# Patient Record
Sex: MALE | NOT HISPANIC OR LATINO | URBAN - METROPOLITAN AREA
[De-identification: names, ages, dates, MRNs, and addresses within clinical notes are randomized per-mention and may not be internally consistent; named-entity substitution may affect disease eponyms.]

---

## 2018-01-13 ENCOUNTER — INPATIENT (INPATIENT)
Facility: HOSPITAL | Age: 63
LOS: 4 days | Discharge: HOME HEALTH | End: 2018-01-18
Attending: HOSPITALIST | Admitting: HOSPITALIST
Payer: COMMERCIAL

## 2018-01-13 VITALS
HEART RATE: 55 BPM | RESPIRATION RATE: 20 BRPM | OXYGEN SATURATION: 100 % | TEMPERATURE: 98 F | SYSTOLIC BLOOD PRESSURE: 175 MMHG | DIASTOLIC BLOOD PRESSURE: 88 MMHG

## 2018-01-13 DIAGNOSIS — I10 ESSENTIAL (PRIMARY) HYPERTENSION: ICD-10-CM

## 2018-01-13 DIAGNOSIS — I72.9 ANEURYSM OF UNSPECIFIED SITE: ICD-10-CM

## 2018-01-13 DIAGNOSIS — I63.9 CEREBRAL INFARCTION, UNSPECIFIED: ICD-10-CM

## 2018-01-13 DIAGNOSIS — Z29.9 ENCOUNTER FOR PROPHYLACTIC MEASURES, UNSPECIFIED: ICD-10-CM

## 2018-01-13 DIAGNOSIS — I67.1 CEREBRAL ANEURYSM, NONRUPTURED: ICD-10-CM

## 2018-01-13 LAB
ALBUMIN SERPL ELPH-MCNC: 4.6 G/DL — SIGNIFICANT CHANGE UP (ref 3.3–5)
ALP SERPL-CCNC: 64 U/L — SIGNIFICANT CHANGE UP (ref 40–120)
ALT FLD-CCNC: 14 U/L — SIGNIFICANT CHANGE UP (ref 4–41)
APPEARANCE UR: CLEAR — SIGNIFICANT CHANGE UP
AST SERPL-CCNC: 13 U/L — SIGNIFICANT CHANGE UP (ref 4–40)
BASOPHILS # BLD AUTO: 0.02 K/UL — SIGNIFICANT CHANGE UP (ref 0–0.2)
BASOPHILS NFR BLD AUTO: 0.2 % — SIGNIFICANT CHANGE UP (ref 0–2)
BILIRUB SERPL-MCNC: 0.8 MG/DL — SIGNIFICANT CHANGE UP (ref 0.2–1.2)
BILIRUB UR-MCNC: NEGATIVE — SIGNIFICANT CHANGE UP
BLOOD UR QL VISUAL: NEGATIVE — SIGNIFICANT CHANGE UP
BUN SERPL-MCNC: 20 MG/DL — SIGNIFICANT CHANGE UP (ref 7–23)
CALCIUM SERPL-MCNC: 9.2 MG/DL — SIGNIFICANT CHANGE UP (ref 8.4–10.5)
CHLORIDE SERPL-SCNC: 99 MMOL/L — SIGNIFICANT CHANGE UP (ref 98–107)
CO2 SERPL-SCNC: 32 MMOL/L — HIGH (ref 22–31)
COLOR SPEC: SIGNIFICANT CHANGE UP
CREAT SERPL-MCNC: 1.32 MG/DL — HIGH (ref 0.5–1.3)
EOSINOPHIL # BLD AUTO: 0.02 K/UL — SIGNIFICANT CHANGE UP (ref 0–0.5)
EOSINOPHIL NFR BLD AUTO: 0.2 % — SIGNIFICANT CHANGE UP (ref 0–6)
GLUCOSE SERPL-MCNC: 151 MG/DL — HIGH (ref 70–99)
GLUCOSE UR-MCNC: NEGATIVE — SIGNIFICANT CHANGE UP
HCT VFR BLD CALC: 53.9 % — HIGH (ref 39–50)
HGB BLD-MCNC: 18.2 G/DL — HIGH (ref 13–17)
IMM GRANULOCYTES # BLD AUTO: 0.03 # — SIGNIFICANT CHANGE UP
IMM GRANULOCYTES NFR BLD AUTO: 0.4 % — SIGNIFICANT CHANGE UP (ref 0–1.5)
KETONES UR-MCNC: NEGATIVE — SIGNIFICANT CHANGE UP
LEUKOCYTE ESTERASE UR-ACNC: NEGATIVE — SIGNIFICANT CHANGE UP
LYMPHOCYTES # BLD AUTO: 1.82 K/UL — SIGNIFICANT CHANGE UP (ref 1–3.3)
LYMPHOCYTES # BLD AUTO: 22.3 % — SIGNIFICANT CHANGE UP (ref 13–44)
MCHC RBC-ENTMCNC: 29.9 PG — SIGNIFICANT CHANGE UP (ref 27–34)
MCHC RBC-ENTMCNC: 33.8 % — SIGNIFICANT CHANGE UP (ref 32–36)
MCV RBC AUTO: 88.5 FL — SIGNIFICANT CHANGE UP (ref 80–100)
MONOCYTES # BLD AUTO: 0.36 K/UL — SIGNIFICANT CHANGE UP (ref 0–0.9)
MONOCYTES NFR BLD AUTO: 4.4 % — SIGNIFICANT CHANGE UP (ref 2–14)
MUCOUS THREADS # UR AUTO: SIGNIFICANT CHANGE UP
NEUTROPHILS # BLD AUTO: 5.91 K/UL — SIGNIFICANT CHANGE UP (ref 1.8–7.4)
NEUTROPHILS NFR BLD AUTO: 72.5 % — SIGNIFICANT CHANGE UP (ref 43–77)
NITRITE UR-MCNC: NEGATIVE — SIGNIFICANT CHANGE UP
NRBC # FLD: 0 — SIGNIFICANT CHANGE UP
PH UR: 7 — SIGNIFICANT CHANGE UP (ref 4.6–8)
PLATELET # BLD AUTO: 213 K/UL — SIGNIFICANT CHANGE UP (ref 150–400)
PMV BLD: 11.1 FL — SIGNIFICANT CHANGE UP (ref 7–13)
POTASSIUM SERPL-MCNC: 4.3 MMOL/L — SIGNIFICANT CHANGE UP (ref 3.5–5.3)
POTASSIUM SERPL-SCNC: 4.3 MMOL/L — SIGNIFICANT CHANGE UP (ref 3.5–5.3)
PROT SERPL-MCNC: 8.4 G/DL — HIGH (ref 6–8.3)
PROT UR-MCNC: 20 MG/DL — SIGNIFICANT CHANGE UP
RBC # BLD: 6.09 M/UL — HIGH (ref 4.2–5.8)
RBC # FLD: 13 % — SIGNIFICANT CHANGE UP (ref 10.3–14.5)
RBC CASTS # UR COMP ASSIST: SIGNIFICANT CHANGE UP (ref 0–?)
SODIUM SERPL-SCNC: 144 MMOL/L — SIGNIFICANT CHANGE UP (ref 135–145)
SP GR SPEC: > 1.04 — HIGH (ref 1–1.04)
UROBILINOGEN FLD QL: NORMAL MG/DL — SIGNIFICANT CHANGE UP
WBC # BLD: 8.16 K/UL — SIGNIFICANT CHANGE UP (ref 3.8–10.5)
WBC # FLD AUTO: 8.16 K/UL — SIGNIFICANT CHANGE UP (ref 3.8–10.5)
WBC UR QL: SIGNIFICANT CHANGE UP (ref 0–?)

## 2018-01-13 PROCEDURE — 99223 1ST HOSP IP/OBS HIGH 75: CPT

## 2018-01-13 PROCEDURE — 70498 CT ANGIOGRAPHY NECK: CPT | Mod: 26

## 2018-01-13 PROCEDURE — 99251: CPT

## 2018-01-13 PROCEDURE — 70496 CT ANGIOGRAPHY HEAD: CPT | Mod: 26

## 2018-01-13 PROCEDURE — 70450 CT HEAD/BRAIN W/O DYE: CPT | Mod: 26,59

## 2018-01-13 RX ORDER — ENOXAPARIN SODIUM 100 MG/ML
40 INJECTION SUBCUTANEOUS EVERY 24 HOURS
Qty: 0 | Refills: 0 | Status: DISCONTINUED | OUTPATIENT
Start: 2018-01-13 | End: 2018-01-18

## 2018-01-13 RX ORDER — ASPIRIN/CALCIUM CARB/MAGNESIUM 324 MG
81 TABLET ORAL ONCE
Qty: 0 | Refills: 0 | Status: COMPLETED | OUTPATIENT
Start: 2018-01-13 | End: 2018-01-13

## 2018-01-13 RX ORDER — ATORVASTATIN CALCIUM 80 MG/1
40 TABLET, FILM COATED ORAL AT BEDTIME
Qty: 0 | Refills: 0 | Status: DISCONTINUED | OUTPATIENT
Start: 2018-01-13 | End: 2018-01-16

## 2018-01-13 RX ORDER — ASPIRIN/CALCIUM CARB/MAGNESIUM 324 MG
81 TABLET ORAL DAILY
Qty: 0 | Refills: 0 | Status: DISCONTINUED | OUTPATIENT
Start: 2018-01-13 | End: 2018-01-18

## 2018-01-13 RX ORDER — SODIUM CHLORIDE 9 MG/ML
1000 INJECTION INTRAMUSCULAR; INTRAVENOUS; SUBCUTANEOUS ONCE
Qty: 0 | Refills: 0 | Status: COMPLETED | OUTPATIENT
Start: 2018-01-13 | End: 2018-01-13

## 2018-01-13 RX ORDER — ATORVASTATIN CALCIUM 80 MG/1
40 TABLET, FILM COATED ORAL ONCE
Qty: 0 | Refills: 0 | Status: COMPLETED | OUTPATIENT
Start: 2018-01-13 | End: 2018-01-13

## 2018-01-13 RX ORDER — METOCLOPRAMIDE HCL 10 MG
10 TABLET ORAL ONCE
Qty: 0 | Refills: 0 | Status: COMPLETED | OUTPATIENT
Start: 2018-01-13 | End: 2018-01-13

## 2018-01-13 RX ADMIN — SODIUM CHLORIDE 1000 MILLILITER(S): 9 INJECTION INTRAMUSCULAR; INTRAVENOUS; SUBCUTANEOUS at 13:12

## 2018-01-13 RX ADMIN — Medication 10 MILLIGRAM(S): at 13:12

## 2018-01-13 RX ADMIN — Medication 81 MILLIGRAM(S): at 15:21

## 2018-01-13 RX ADMIN — ATORVASTATIN CALCIUM 40 MILLIGRAM(S): 80 TABLET, FILM COATED ORAL at 22:04

## 2018-01-13 RX ADMIN — ATORVASTATIN CALCIUM 40 MILLIGRAM(S): 80 TABLET, FILM COATED ORAL at 15:00

## 2018-01-13 RX ADMIN — ENOXAPARIN SODIUM 40 MILLIGRAM(S): 100 INJECTION SUBCUTANEOUS at 22:04

## 2018-01-13 NOTE — H&P ADULT - NEUROLOGICAL DETAILS
sensation intact/no spontaneous movement/alert and oriented x 3/responds to verbal commands/cranial nerves intact/normal strength

## 2018-01-13 NOTE — CONSULT NOTE ADULT - ASSESSMENT
62 yo man who presents to [facility] w/ 2 week onset of worsening dizziness (room is spinning) refractory to meclizine (given by OSH 2 weeks ago). Patinet notes dizziness is worse w/ movement. ROS also revealed gait ataxia (left-sided preference) and N/V, otherwise unremarkable for focal weakness, numbness/tingling, acute vision changes, chest pain, fever, URI symptoms, tinnitus. 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 Patient's SBP > 190 on admission. tPA not given, patient outside recommended window. NIHSS 0 MRS 0 64 yo right-handed  man who presents to [facility] w/ 2 week onset of worsening dizziness (room is spinning) refractory to meclizine (given by OSH 2 weeks ago). Patient notes dizziness is worse w/ movement. ROS also revealed gait ataxia (left-sided preference) and N/V, otherwise unremarkable for focal weakness, numbness/tingling, acute vision changes, chest pain, fever, URI symptoms, tinnitus. 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 Patient's SBP > 190 on admission. tPA not given, patient outside recommended window. NIHSS 0 MRS 0

## 2018-01-13 NOTE — H&P ADULT - ASSESSMENT
63M admitted for CVA and 2.5 mm downward projecting aneurysm off the distal right M1 segment just distal to a focal area of stenosis along the right M1 segment.

## 2018-01-13 NOTE — ED PROVIDER NOTE - OBJECTIVE STATEMENT
63 YOM pmh vertigo dx 2 weeks ago on meclizine at home, HTN (uncontrolled on sartan, metoprolol) presents to ed with worsening of his vertigo, ataxia and nausea and vomiting. Pt denies any weakness or numbness in extremities. Pt denies any vision changes. 63 YOM pmh vertigo dx 2 weeks ago on meclizine at home, HTN (uncontrolled on sartan, metoprolol) presents to ed with worsening of his vertigo, ataxia and nausea and vomiting. Pt denies any weakness or numbness in extremities. Pt denies any vision changes. Pt denies any chest pain, denies any lightheadedness, denies any fever URI symptoms, denies any ringing in ears. Symptoms are triggered with movement. Pt states he feels as if he is unsteady when he is walking and that acutely worsened last night. Pt is falling to left side.

## 2018-01-13 NOTE — H&P ADULT - RS GEN PE MLT RESP DETAILS PC
no intercostal retractions/no rales/respirations non-labored/no chest wall tenderness/no subcutaneous emphysema/breath sounds equal/clear to auscultation bilaterally/good air movement/no rhonchi/airway patent

## 2018-01-13 NOTE — H&P ADULT - HISTORY OF PRESENT ILLNESS
63M self ambulating with a history of HTN, experiencing exertional, dizziness, describes a the room spinning, ataxia, nausea, vomit x 3 episodes in the past 3 days, last vomit wan on 1/12, HA. Two weeks ago, the pt went to [facility] when the symptoms began.  He was diagnosed with Vertigo and placed on Meclizine. The symptoms did not resolve with Meclizine, so the pt came to [facility]. The pt denies falls, tinnitus, vison changes, chest pain, palpitation, SOB, weakness to any place on his body, chills, diaphoresis, dysuria. In the Ed, the pt had a CTH that revealed Right cerebellar infarct of indeterminate age. Brain MRI follow-up recommended. Probable chronic left thalamic lacunar infarct.  CTA Head showed 2.5 mm downward projecting aneurysm off the distal right M1 segment just distal to a focal area of stenosis along the right M1 segment.  The pt was seen by Neurology. Their consult and recommendations are in the chart.  Neurosurgery was called for consult on the 2.5 mm downward projecting aneurysm. Currently the pt still experiencing exertional dizziness and ataxia. 63M  with a history of HTN, came in c/o dizziness and ataxia describes a the room spinning (since Dec 31st) , pt also reports, nausea, vomiting x 3 episodes in the past 3 days, last episode of vomiting was on 1/12. Two weeks ago, the pt went to [facility] when the symptoms began.  He was diagnosed with Vertigo and placed on Meclizine. The symptoms did not resolve with Meclizine, so the pt came to [facility]. The pt denies falls, tinnitus, vison changes, chest pain, palpitation, SOB, weakness, chills, diaphoresis, dysuria. In the ED, the pt had a CTH that revealed Right cerebellar infarct of indeterminate age. Brain MRI follow-up recommended. Probable chronic left thalamic lacunar infarct.  CTA Head showed 2.5 mm downward projecting aneurysm off the distal right M1 segment just distal to a focal area of stenosis along the right M1 segment.  The pt was seen by Neurology. Their consult and recommendations are in the chart.  Neurosurgery was called for consult on the 2.5 mm downward projecting aneurysm. Currently the pt still experiencing exertional dizziness and ataxia.

## 2018-01-13 NOTE — ED PROVIDER NOTE - MEDICAL DECISION MAKING DETAILS
Ataxia worsening over past 2 weeks, concern for posterior CVA. Will treat symptomatically get Ekg, CTH and basic labs. Ddx CVA, BPPV. Will get neurology consult likely CDU for MRI if CT negative.

## 2018-01-13 NOTE — CONSULT NOTE ADULT - PROBLEM SELECTOR RECOMMENDATION 2
Outpatient follow up with Dr Betts [phone]
2.5mm distal M1  Plan:   -recommend neurosurgery consult   -BP control

## 2018-01-13 NOTE — H&P ADULT - NEGATIVE ENMT SYMPTOMS
no nasal congestion/no nasal obstruction/no tinnitus/no sinus symptoms/no nasal discharge/no recurrent cold sores/no abnormal taste sensation/no post-nasal discharge/no gum bleeding

## 2018-01-13 NOTE — H&P ADULT - ATTENDING COMMENTS
Pt was seen and examined, agree with H&P done by PA edited as appropriate briefly this is a 63YOM with h/o htn, admitted for CVA ( cerebellar infarct ) and 2.5 mm downward projecting aneurysm off the distal right M1 segment just distal to a focal area of stenosis along the right M1 segment.  Exam: as above  Plan: Neuro consult appreciated, ct head done, ct angio of the head and neck done already, await MRI, check echo, tele, f/u ekg, permissive htn for now, PT consult, neuro sx consult for aneurysm, dvt ppx, plan discussed with PA.

## 2018-01-13 NOTE — H&P ADULT - MUSCULOSKELETAL
details… detailed exam no joint swelling/no joint erythema/no joint warmth/no calf tenderness/normal strength

## 2018-01-13 NOTE — ED ADULT TRIAGE NOTE - CHIEF COMPLAINT QUOTE
Pt was treated for vertigo  on 12/31 . Pt given meclizine Per  family and pt medication  not working . Pt co dizziness with nausea  on and off and unsteady balance.

## 2018-01-13 NOTE — CONSULT NOTE ADULT - SUBJECTIVE AND OBJECTIVE BOX
62 yo right-handed male who presents to [facility] w/ 2 week onset of worsening dizziness (room is spinning) refractory to meclizine (given by OSH 2 weeks ago). Patient notes dizziness is worse w/ movement. ROS also revealed gait ataxia (left-sided preference) and N/V, otherwise unremarkable for focal weakness, numbness/tingling, acute vision changes, chest pain, fever, URI symptoms, tinnitus.     WDWN male in NAD  Vital Signs Last 24 Hrs  T(C): 37 (13 Jan 2018 17:24), Max: 37 (13 Jan 2018 17:24)  T(F): 98.6 (13 Jan 2018 17:24), Max: 98.6 (13 Jan 2018 17:24)  HR: 62 (13 Jan 2018 17:24) (54 - 62)  BP: 148/84 (13 Jan 2018 17:24) (143/79 - 192/100)  BP(mean): --  RR: 16 (13 Jan 2018 17:24) (16 - 20)  SpO2: 99% (13 Jan 2018 17:24) (98% - 100%)    AAO X 3  PERRLA, EOMI  CN 2-12 grossly intact  PENA strength 5/5, no pronator drift    Brain CT:  There is subtle lucency noted within the right cerebellar   hemisphere consistent with an infarct of indeterminate age.    Left thalamic lucency likely represents chronic lacunar infarct.    CTA head/neck: Nonvisualization and likely occlusion of the right posterior   inferior cerebellar artery. This correlates to patient's right cerebellar   infarct seen on previous head CT. Variant cerebral arterial anatomy as   noted above.  2.5 mm downward projecting aneurysm off the distal right M1 segment just   distal to a focal area of stenosis along the right M1 segment..

## 2018-01-13 NOTE — H&P ADULT - GASTROINTESTINAL DETAILS
no rebound tenderness/no rigidity/soft/no bruit/no guarding/nontender/no organomegaly/no masses palpable/bowel sounds normal

## 2018-01-13 NOTE — ED PROVIDER NOTE - NS ED ROS FT
CONSTITUTIONAL: No fevers, no chills  Eyes: no visual changes  Ears: no ear drainage, no ear pain  Nose: no nasal congestion  Mouth/Throat: no sore throat  Cardiovascular: No Chest pain  Respiratory: No SOB  Gastrointestinal: No n/v/d, no abd pain  Genitourinary: no dysuria, no hematuria  SKIN: no rashes.  NEURO: +vertigo  PSYCHIATRIC: no known mental health issues.

## 2018-01-13 NOTE — H&P ADULT - NSHPSOCIALHISTORY_GEN_ALL_CORE
and lives with spouse.   Denies Nicotine.   Denies ETOH.  and lives with spouse.   Denies Nicotine.   Denies ETOH or drug use

## 2018-01-13 NOTE — H&P ADULT - NEGATIVE MUSCULOSKELETAL SYMPTOMS
no back pain/no joint swelling/no muscle weakness/no muscle cramps/no leg pain L/no myalgia/no stiffness/no neck pain/no arm pain L/no arm pain R/no leg pain R

## 2018-01-13 NOTE — ED ADULT NURSE REASSESSMENT NOTE - NS ED NURSE REASSESS COMMENT FT1
pt passed dysphagia screening for fluids  dinner tray given/ pt comfortable and awaits tele bed.  family at bedside

## 2018-01-13 NOTE — H&P ADULT - PROBLEM SELECTOR PLAN 1
Neuro consult appreciated.   CM  F/U EKG, TTE Bubble study, MRI Brain, FLP, LDL Goal < 70, A1C.  if CTH negative for hemorrhage, start ASA 81mg, statin  Permissive HTN, -180  PT/OT/ SS .

## 2018-01-13 NOTE — CONSULT NOTE ADULT - SUBJECTIVE AND OBJECTIVE BOX
Neurology Consult    Name: DEBORAH KENNEDY    HPI: 64 yo man who presents to [facility] w/ 2 week onset of worsening dizziness (room is spinning) refractory to meclizine (given by OSH 2 weeks ago). Michael notes dizziness is worse w/ movement. ROS also revealed gait ataxia (left-sided preference) and N/V, otherwise unremarkable for focal weakness, numbness/tingling, acute vision changes, chest pain, fever, URI symptoms, tinnitus. 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 Patient's SBP > 190 on admission. tPA not given, patient outside recommended window. NIHSS MRS     PMH/PSH:   HTN (poorly controlled)   HLD     MEDICATIONS  (STANDING):  aspirin  chewable 81 milliGRAM(s) Oral once  atorvastatin 40 milliGRAM(s) Oral once    Allergies: No Known Allergies    Objective:   Vital Signs Last 24 Hrs  T(C): 36.7 (13 Jan 2018 11:12), Max: 36.7 (13 Jan 2018 11:12)  T(F): 98 (13 Jan 2018 11:12), Max: 98 (13 Jan 2018 11:12)  HR: 60 (13 Jan 2018 13:11) (55 - 60)  BP: 143/79 (13 Jan 2018 13:11) (143/79 - 192/100)  BP(mean): --  RR: 18 (13 Jan 2018 13:11) (18 - 20)  SpO2: 100% (13 Jan 2018 13:11) (100% - 100%)    General Exam:   General appearance: No acute distress                   Neurological Exam:  Mental Status: AAOx3, fluent speech, follows commands    Cranial Nerves: EOMI, PERRL, V1-V3 intact, facial symmetry intact, no dysarthria, tongue midline, VFF    Motor: 5/5 throughout. No drift x4    Sensation: Intact to LT throughout    Coordination: FTN intact b/l    Reflexes: 1+ bilateral biceps, brachioradialis, patellar and ankle    Gait: normal and stable.      Labs:    01-13    144  |  99  |  20  ----------------------------<  151<H>  4.3   |  32<H>  |  1.32<H>    Ca    9.2      13 Jan 2018 12:45    TPro  8.4<H>  /  Alb  4.6  /  TBili  0.8  /  DBili  x   /  AST  13  /  ALT  14  /  AlkPhos  64  01-13    LIVER FUNCTIONS - ( 13 Jan 2018 12:45 )  Alb: 4.6 g/dL / Pro: 8.4 g/dL / ALK PHOS: 64 u/L / ALT: 14 u/L / AST: 13 u/L / GGT: x           CBC Full  -  ( 13 Jan 2018 12:45 )  WBC Count : 8.16 K/uL  Hemoglobin : 18.2 g/dL  Hematocrit : 53.9 %  Platelet Count - Automated : 213 K/uL  Mean Cell Volume : 88.5 fL  Mean Cell Hemoglobin : 29.9 pg  Mean Cell Hemoglobin Concentration : 33.8 %  Auto Neutrophil # : 5.91 K/uL  Auto Lymphocyte # : 1.82 K/uL  Auto Monocyte # : 0.36 K/uL  Auto Eosinophil # : 0.02 K/uL  Auto Basophil # : 0.02 K/uL  Auto Neutrophil % : 72.5 %  Auto Lymphocyte % : 22.3 %  Auto Monocyte % : 4.4 %  Auto Eosinophil % : 0.2 %  Auto Basophil % : 0.2 %      Radiology  < from: CT Head No Cont (01.13.18 @ 13:04) >  Impression:    Right cerebellar infarct of indeterminate age. Brain MRI follow-up is   recommended.  Probable chronic left thalamic lacunar infarct.    < end of copied text > Neurology Consult    Name: DEBORAH KENNEDY    HPI: 64 yo man who presents to [facility] w/ 2 week onset of worsening dizziness (room is spinning) refractory to meclizine (given by OSH 2 weeks ago). Patinet notes dizziness is worse w/ movement. ROS also revealed gait ataxia (left-sided preference) and N/V, otherwise unremarkable for focal weakness, numbness/tingling, acute vision changes, chest pain, fever, URI symptoms, tinnitus. 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 Patient's SBP > 190 on admission. tPA not given, patient outside recommended window. NIHSS 0 MRS 0    PMH/PSH:   HTN (poorly controlled)   HLD     MEDICATIONS  (STANDING):  aspirin  chewable 81 milliGRAM(s) Oral once  atorvastatin 40 milliGRAM(s) Oral once    Allergies: No Known Allergies    Objective:   Vital Signs Last 24 Hrs  T(C): 36.7 (13 Jan 2018 11:12), Max: 36.7 (13 Jan 2018 11:12)  T(F): 98 (13 Jan 2018 11:12), Max: 98 (13 Jan 2018 11:12)  HR: 60 (13 Jan 2018 13:11) (55 - 60)  BP: 143/79 (13 Jan 2018 13:11) (143/79 - 192/100)  BP(mean): --  RR: 18 (13 Jan 2018 13:11) (18 - 20)  SpO2: 100% (13 Jan 2018 13:11) (100% - 100%)    General Exam:   General appearance: No acute distress                   Neurological Exam:  Mental Status: AAOx3, fluent speech, follows commands    Cranial Nerves: EOMI w/ b/l horizontal nystagmus (not fatiguable) and vertical nystagmus (fatiguable), PERRL, V1-V3 intact, facial symmetry intact, no dysarthria, tongue midline, VFF    Motor: 5/5 throughout. No drift x4    Sensation: Intact to LT throughout    Coordination: FTN intact b/l, HTS intact b/l, slower on right UE w/ alternating finger movements vs. normal left.     Reflexes: 1+ bilateral biceps, brachioradialis, patellar and ankle, Babinski absent (toes downgoing b/l)     Gait: unstable gait (left-sided lean)      Labs:    01-13    144  |  99  |  20  ----------------------------<  151<H>  4.3   |  32<H>  |  1.32<H>    Ca    9.2      13 Jan 2018 12:45    TPro  8.4<H>  /  Alb  4.6  /  TBili  0.8  /  DBili  x   /  AST  13  /  ALT  14  /  AlkPhos  64  01-13    LIVER FUNCTIONS - ( 13 Jan 2018 12:45 )  Alb: 4.6 g/dL / Pro: 8.4 g/dL / ALK PHOS: 64 u/L / ALT: 14 u/L / AST: 13 u/L / GGT: x           CBC Full  -  ( 13 Jan 2018 12:45 )  WBC Count : 8.16 K/uL  Hemoglobin : 18.2 g/dL  Hematocrit : 53.9 %  Platelet Count - Automated : 213 K/uL  Mean Cell Volume : 88.5 fL  Mean Cell Hemoglobin : 29.9 pg  Mean Cell Hemoglobin Concentration : 33.8 %  Auto Neutrophil # : 5.91 K/uL  Auto Lymphocyte # : 1.82 K/uL  Auto Monocyte # : 0.36 K/uL  Auto Eosinophil # : 0.02 K/uL  Auto Basophil # : 0.02 K/uL  Auto Neutrophil % : 72.5 %  Auto Lymphocyte % : 22.3 %  Auto Monocyte % : 4.4 %  Auto Eosinophil % : 0.2 %  Auto Basophil % : 0.2 %      Radiology  < from: CT Head No Cont (01.13.18 @ 13:04) >  Impression:    Right cerebellar infarct of indeterminate age. Brain MRI follow-up is   recommended.  Probable chronic left thalamic lacunar infarct.    < end of copied text >  < from: CT Angio Neck w/ IV Cont (01.13.18 @ 14:32) >  CTA HEAD: Nonvisualization and likely occlusion of the right posterior   inferior cerebellar artery. This correlates to patient's right cerebellar   infarct seen on previous head CT. Variant cerebral arterial anatomy as   noted above.  2.5 mm downwardprojecting aneurysm off the distal right M1 segment just   distal to a focal area of stenosis along the right M1 segment..    < end of copied text > Neurology Consult    Name: DEBORAH KENNEDY    HPI: 64 yo right-handed  man who presents to [facility] w/ 2 week onset of worsening dizziness (room is spinning) refractory to meclizine (given by OSH 2 weeks ago). Patient notes dizziness is worse w/ movement. ROS also revealed gait ataxia (left-sided preference) and N/V, otherwise unremarkable for focal weakness, numbness/tingling, acute vision changes, chest pain, fever, URI symptoms, tinnitus. 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 Patient's SBP > 190 on admission. tPA not given, patient outside recommended window. NIHSS 0 MRS 0    PMH/PSH:   HTN (poorly controlled)   HLD     MEDICATIONS  (STANDING):  aspirin  chewable 81 milliGRAM(s) Oral once  atorvastatin 40 milliGRAM(s) Oral once    Allergies: No Known Allergies    Objective:   Vital Signs Last 24 Hrs  T(C): 36.7 (13 Jan 2018 11:12), Max: 36.7 (13 Jan 2018 11:12)  T(F): 98 (13 Jan 2018 11:12), Max: 98 (13 Jan 2018 11:12)  HR: 60 (13 Jan 2018 13:11) (55 - 60)  BP: 143/79 (13 Jan 2018 13:11) (143/79 - 192/100)  BP(mean): --  RR: 18 (13 Jan 2018 13:11) (18 - 20)  SpO2: 100% (13 Jan 2018 13:11) (100% - 100%)    General Exam:   General appearance: No acute distress                   Neurological Exam:  Mental Status: AAOx3, fluent speech, follows commands    Cranial Nerves: EOMI w/ b/l horizontal nystagmus (not fatiguable) and vertical nystagmus (fatiguable), PERRL, V1-V3 intact, facial symmetry intact, no dysarthria, tongue midline, VFF    Motor: 5/5 throughout. No drift x4    Sensation: Intact to LT throughout    Coordination: FTN intact b/l, HTS intact b/l, slower on right UE w/ alternating finger movements vs. normal left.     Reflexes: 1+ bilateral biceps, brachioradialis, patellar and ankle, Babinski absent (toes downgoing b/l)     Gait: unstable gait (left-sided lean)      Labs:    01-13    144  |  99  |  20  ----------------------------<  151<H>  4.3   |  32<H>  |  1.32<H>    Ca    9.2      13 Jan 2018 12:45    TPro  8.4<H>  /  Alb  4.6  /  TBili  0.8  /  DBili  x   /  AST  13  /  ALT  14  /  AlkPhos  64  01-13    LIVER FUNCTIONS - ( 13 Jan 2018 12:45 )  Alb: 4.6 g/dL / Pro: 8.4 g/dL / ALK PHOS: 64 u/L / ALT: 14 u/L / AST: 13 u/L / GGT: x           CBC Full  -  ( 13 Jan 2018 12:45 )  WBC Count : 8.16 K/uL  Hemoglobin : 18.2 g/dL  Hematocrit : 53.9 %  Platelet Count - Automated : 213 K/uL  Mean Cell Volume : 88.5 fL  Mean Cell Hemoglobin : 29.9 pg  Mean Cell Hemoglobin Concentration : 33.8 %  Auto Neutrophil # : 5.91 K/uL  Auto Lymphocyte # : 1.82 K/uL  Auto Monocyte # : 0.36 K/uL  Auto Eosinophil # : 0.02 K/uL  Auto Basophil # : 0.02 K/uL  Auto Neutrophil % : 72.5 %  Auto Lymphocyte % : 22.3 %  Auto Monocyte % : 4.4 %  Auto Eosinophil % : 0.2 %  Auto Basophil % : 0.2 %      Radiology  < from: CT Head No Cont (01.13.18 @ 13:04) >  Impression:    Right cerebellar infarct of indeterminate age. Brain MRI follow-up is   recommended.  Probable chronic left thalamic lacunar infarct.    < end of copied text >  < from: CT Angio Neck w/ IV Cont (01.13.18 @ 14:32) >  CTA HEAD: Nonvisualization and likely occlusion of the right posterior   inferior cerebellar artery. This correlates to patient's right cerebellar   infarct seen on previous head CT. Variant cerebral arterial anatomy as   noted above.  2.5 mm downwardprojecting aneurysm off the distal right M1 segment just   distal to a focal area of stenosis along the right M1 segment..    < end of copied text >

## 2018-01-13 NOTE — H&P ADULT - NSHPLABSRESULTS_GEN_ALL_CORE
CT Head= Right cerebellar infarct of indeterminate age. Brain MRI follow-up recommended. Probable chronic left thalamic lacunar infarct.    CTA HEAD: Nonvisualization and likely occlusion of the right posterior inferior cerebellar artery. This correlates to patient's right cerebellar infarct seen on previous head CT. Variant cerebral arterial anatomy as noted above. 2.5 mm downward projecting aneurysm off the distal right M1 segment just distal to a focal area of stenosis along the right M1 segment..    CTA NECK: Patent cervical vasculature. No flow limiting stenosis or occlusion by NASCET criteria.   Permissive HTN, -180  H &H = 18.2/ 53.9  BUN/ Creatinine= 20/ 1.32  Glucose= 157  UA = Clear

## 2018-01-13 NOTE — ED PROVIDER NOTE - ATTENDING CONTRIBUTION TO CARE
DR. GUTIERREZ, ATTENDING MD-  I performed a face to face bedside interview with patient regarding history of present illness, review of symptoms and past medical history. I completed an independent physical exam.  I have discussed patient's plan of care with the resident.   Documentation as above in the note.    64 y/o male h/o high chol with vertigo x2 wks with continued sx refractory to meclizine.  Seen at [facility] at onset of sx, had bloodwork done, dc with dx of bppv.  States meclizine not helping, sx worsening since last night.  Denies f/c, ha, neck stiffness, cp, sob, cough, abd pain, n/v/d, dysuria, rash.  Afebrile, vs wnl, nad, perrla, eomi, horiz nystagmus, ctabil, s1s2 rrr no m/r/g, abd soft non ttp no r/g, no cva tenderness b/l, no leg swelling b/l, no rash, motor 5/5 x4 ext, distal sensory grossly intact x4 ext, ataxic gait, falling to left.  BPPV vs central vertigo.  Obtain ct head, cbc, bmp, antic cdu for mri after neuro eval.

## 2018-01-13 NOTE — CONSULT NOTE ADULT - PROBLEM SELECTOR RECOMMENDATION 9
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 cardioembolic)    Plan:   -admit to medicine   -[x] CTH  -[x] CTA H/N  -[] MRI brain  -[]TTE/EROS   -[]tele   -Labs: A1c, LDL (goal < 70)  -if CTH negative for hemorrhage, start ASA 81mg, statin (pending lipid panel)   -Permissive HTN, -180  -PT/OT/ SS (if patient does not pass bedside swallow)

## 2018-01-13 NOTE — ED ADULT NURSE NOTE - OBJECTIVE STATEMENT
pt alert,oriented x3. skin warm,dry. c/o balance off since 12/31. reports n,v.. c/o headache.  denies ch pains. eval by md at present,labs sent

## 2018-01-13 NOTE — H&P ADULT - NEGATIVE OPHTHALMOLOGIC SYMPTOMS
no photophobia/no discharge L/no lacrimation R/no blurred vision L/no blurred vision R/no lacrimation L/no discharge R

## 2018-01-14 DIAGNOSIS — I63.9 CEREBRAL INFARCTION, UNSPECIFIED: ICD-10-CM

## 2018-01-14 DIAGNOSIS — I69.998 OTHER SEQUELAE FOLLOWING UNSPECIFIED CEREBROVASCULAR DISEASE: ICD-10-CM

## 2018-01-14 LAB
BUN SERPL-MCNC: 19 MG/DL — SIGNIFICANT CHANGE UP (ref 7–23)
CALCIUM SERPL-MCNC: 8.5 MG/DL — SIGNIFICANT CHANGE UP (ref 8.4–10.5)
CHLORIDE SERPL-SCNC: 101 MMOL/L — SIGNIFICANT CHANGE UP (ref 98–107)
CHOLEST SERPL-MCNC: 259 MG/DL — HIGH (ref 120–199)
CO2 SERPL-SCNC: 28 MMOL/L — SIGNIFICANT CHANGE UP (ref 22–31)
CREAT SERPL-MCNC: 1.29 MG/DL — SIGNIFICANT CHANGE UP (ref 0.5–1.3)
GLUCOSE SERPL-MCNC: 109 MG/DL — HIGH (ref 70–99)
HBA1C BLD-MCNC: 7 % — HIGH (ref 4–5.6)
HCT VFR BLD CALC: 47.6 % — SIGNIFICANT CHANGE UP (ref 39–50)
HDLC SERPL-MCNC: 42 MG/DL — SIGNIFICANT CHANGE UP (ref 35–55)
HGB BLD-MCNC: 16.6 G/DL — SIGNIFICANT CHANGE UP (ref 13–17)
LIPID PNL WITH DIRECT LDL SERPL: 185 MG/DL — SIGNIFICANT CHANGE UP
MAGNESIUM SERPL-MCNC: 2.3 MG/DL — SIGNIFICANT CHANGE UP (ref 1.6–2.6)
MCHC RBC-ENTMCNC: 31.3 PG — SIGNIFICANT CHANGE UP (ref 27–34)
MCHC RBC-ENTMCNC: 34.9 % — SIGNIFICANT CHANGE UP (ref 32–36)
MCV RBC AUTO: 89.6 FL — SIGNIFICANT CHANGE UP (ref 80–100)
NRBC # FLD: 0 — SIGNIFICANT CHANGE UP
PHOSPHATE SERPL-MCNC: 2.5 MG/DL — SIGNIFICANT CHANGE UP (ref 2.5–4.5)
PLATELET # BLD AUTO: 190 K/UL — SIGNIFICANT CHANGE UP (ref 150–400)
PMV BLD: 11.5 FL — SIGNIFICANT CHANGE UP (ref 7–13)
POTASSIUM SERPL-MCNC: 3.6 MMOL/L — SIGNIFICANT CHANGE UP (ref 3.5–5.3)
POTASSIUM SERPL-SCNC: 3.6 MMOL/L — SIGNIFICANT CHANGE UP (ref 3.5–5.3)
RBC # BLD: 5.31 M/UL — SIGNIFICANT CHANGE UP (ref 4.2–5.8)
RBC # FLD: 13 % — SIGNIFICANT CHANGE UP (ref 10.3–14.5)
SODIUM SERPL-SCNC: 142 MMOL/L — SIGNIFICANT CHANGE UP (ref 135–145)
TRIGL SERPL-MCNC: 172 MG/DL — HIGH (ref 10–149)
TSH SERPL-MCNC: 2.79 UIU/ML — SIGNIFICANT CHANGE UP (ref 0.27–4.2)
WBC # BLD: 8.84 K/UL — SIGNIFICANT CHANGE UP (ref 3.8–10.5)
WBC # FLD AUTO: 8.84 K/UL — SIGNIFICANT CHANGE UP (ref 3.8–10.5)

## 2018-01-14 PROCEDURE — 99233 SBSQ HOSP IP/OBS HIGH 50: CPT

## 2018-01-14 PROCEDURE — 70551 MRI BRAIN STEM W/O DYE: CPT | Mod: 26

## 2018-01-14 PROCEDURE — 99223 1ST HOSP IP/OBS HIGH 75: CPT

## 2018-01-14 RX ORDER — INFLUENZA VIRUS VACCINE 15; 15; 15; 15 UG/.5ML; UG/.5ML; UG/.5ML; UG/.5ML
0.5 SUSPENSION INTRAMUSCULAR ONCE
Qty: 0 | Refills: 0 | Status: DISCONTINUED | OUTPATIENT
Start: 2018-01-14 | End: 2018-01-18

## 2018-01-14 RX ORDER — ACETAMINOPHEN 500 MG
650 TABLET ORAL ONCE
Qty: 0 | Refills: 0 | Status: COMPLETED | OUTPATIENT
Start: 2018-01-14 | End: 2018-01-14

## 2018-01-14 RX ORDER — ACETAMINOPHEN 500 MG
650 TABLET ORAL EVERY 6 HOURS
Qty: 0 | Refills: 0 | Status: DISCONTINUED | OUTPATIENT
Start: 2018-01-14 | End: 2018-01-18

## 2018-01-14 RX ADMIN — Medication 81 MILLIGRAM(S): at 12:18

## 2018-01-14 RX ADMIN — Medication 650 MILLIGRAM(S): at 17:21

## 2018-01-14 RX ADMIN — ATORVASTATIN CALCIUM 40 MILLIGRAM(S): 80 TABLET, FILM COATED ORAL at 21:40

## 2018-01-14 RX ADMIN — ENOXAPARIN SODIUM 40 MILLIGRAM(S): 100 INJECTION SUBCUTANEOUS at 21:40

## 2018-01-14 RX ADMIN — Medication 650 MILLIGRAM(S): at 11:19

## 2018-01-14 RX ADMIN — Medication 650 MILLIGRAM(S): at 17:59

## 2018-01-14 RX ADMIN — Medication 650 MILLIGRAM(S): at 10:19

## 2018-01-14 NOTE — CHART NOTE - NSCHARTNOTEFT_GEN_A_CORE
Pt c/o headache at the base of the skull which is similar to previous headaches he has had in the past.  Exam:  Neurologically grossly normal    ICU Vital Signs Last 24 Hrs  T(C): 37.1 (14 Jan 2018 05:39), Max: 37.2 (13 Jan 2018 22:20)  T(F): 98.7 (14 Jan 2018 05:39), Max: 99 (13 Jan 2018 22:20)  HR: 53 (14 Jan 2018 09:45) (53 - 62)  BP: 148/84 (14 Jan 2018 09:45) (143/79 - 192/100)  BP(mean): --  ABP: --  ABP(mean): --  RR: 17 (14 Jan 2018 05:39) (16 - 20)  SpO2: 96% (14 Jan 2018 09:45) (95% - 100%)    Plan:  Tylenol for headache  Monitor neurological exam  MRI Head pending, will expedite if symptoms change

## 2018-01-14 NOTE — PROGRESS NOTE ADULT - SUBJECTIVE AND OBJECTIVE BOX
Patient is a 63y old  Male who presents with a chief complaint of Dizziness x 2 weeks (13 Jan 2018 19:34)      SUBJECTIVE / OVERNIGHT EVENTS: Pt reports intermittent headache and dizziness. Denies weakness    MEDICATIONS  (STANDING):  aspirin enteric coated 81 milliGRAM(s) Oral daily  atorvastatin 40 milliGRAM(s) Oral at bedtime  enoxaparin Injectable 40 milliGRAM(s) SubCutaneous every 24 hours  influenza   Vaccine 0.5 milliLiter(s) IntraMuscular once    MEDICATIONS  (PRN):      T(C): 37.1 (01-14-18 @ 05:39), Max: 37.2 (01-13-18 @ 22:20)  HR: 53 (01-14-18 @ 09:45) (53 - 62)  BP: 148/84 (01-14-18 @ 09:45) (143/79 - 192/100)  RR: 17 (01-14-18 @ 05:39) (16 - 20)  SpO2: 96% (01-14-18 @ 09:45) (95% - 100%)  CAPILLARY BLOOD GLUCOSE        I&O's Summary      PHYSICAL EXAM:  GENERAL: NAD,   HEAD:  Normocephalic  EYES: EOMI,  conjunctiva and sclera clear  NECK: Supple,   CHEST/LUNG: Clear to auscultation bilaterally; No wheeze  HEART:  s1 s2 + Regular rate and rhythm;   ABDOMEN: Soft, Nontender, Nondistended; Bowel sounds present  EXTREMITIES:   No clubbing, cyanosis  NEURO: AAOx3      LABS:                        16.6   8.84  )-----------( 190      ( 14 Jan 2018 05:32 )             47.6     01-14    142  |  101  |  19  ----------------------------<  109<H>  3.6   |  28  |  1.29    Ca    8.5      14 Jan 2018 05:32  Phos  2.5     01-14  Mg     2.3     01-14    TPro  8.4<H>  /  Alb  4.6  /  TBili  0.8  /  DBili  x   /  AST  13  /  ALT  14  /  AlkPhos  64  01-13          Urinalysis Basic - ( 13 Jan 2018 15:00 )    Color: COLORL / Appearance: CLEAR / SG: > 1.040 / pH: 7.0  Gluc: NEGATIVE / Ketone: NEGATIVE  / Bili: NEGATIVE / Urobili: NORMAL mg/dL   Blood: NEGATIVE / Protein: 20 mg/dL / Nitrite: NEGATIVE   Leuk Esterase: NEGATIVE / RBC: 0-2 / WBC 0-2   Sq Epi: x / Non Sq Epi: x / Bacteria: x        Consultant(s) Notes Reviewed:  Neurology, Neurosurgery

## 2018-01-15 PROCEDURE — 99233 SBSQ HOSP IP/OBS HIGH 50: CPT

## 2018-01-15 RX ORDER — METOCLOPRAMIDE HCL 10 MG
10 TABLET ORAL ONCE
Qty: 0 | Refills: 0 | Status: COMPLETED | OUTPATIENT
Start: 2018-01-15 | End: 2018-01-15

## 2018-01-15 RX ORDER — AMLODIPINE BESYLATE 2.5 MG/1
5 TABLET ORAL DAILY
Qty: 0 | Refills: 0 | Status: DISCONTINUED | OUTPATIENT
Start: 2018-01-15 | End: 2018-01-16

## 2018-01-15 RX ADMIN — ATORVASTATIN CALCIUM 40 MILLIGRAM(S): 80 TABLET, FILM COATED ORAL at 21:43

## 2018-01-15 RX ADMIN — AMLODIPINE BESYLATE 5 MILLIGRAM(S): 2.5 TABLET ORAL at 12:33

## 2018-01-15 RX ADMIN — Medication 650 MILLIGRAM(S): at 10:00

## 2018-01-15 RX ADMIN — ENOXAPARIN SODIUM 40 MILLIGRAM(S): 100 INJECTION SUBCUTANEOUS at 21:43

## 2018-01-15 RX ADMIN — Medication 650 MILLIGRAM(S): at 09:09

## 2018-01-15 RX ADMIN — Medication 81 MILLIGRAM(S): at 11:12

## 2018-01-15 RX ADMIN — Medication 10 MILLIGRAM(S): at 12:33

## 2018-01-15 NOTE — OCCUPATIONAL THERAPY INITIAL EVALUATION ADULT - LIVES WITH, PROFILE
Pt. reports he lives with his wife in a house with 6 steps to enter. Once inside, pt. reports he has a full flight of steps to negotiate to reach 2nd floor where main bedroom and bathroom are located. Per pt., he has a bathtub in his bathroom with grab bar available.

## 2018-01-15 NOTE — OCCUPATIONAL THERAPY INITIAL EVALUATION ADULT - GENERAL OBSERVATIONS, REHAB EVAL
Pt. received semisupine in bed. No acute distress. Patient agreed to evaluation from Occupational Therapist. +Heplock, +Tele. Wife present bedside. PT present bedside to perform PT evaluation in conjunction with OT evaluation.

## 2018-01-15 NOTE — PROGRESS NOTE ADULT - SUBJECTIVE AND OBJECTIVE BOX
Patient is a 63y old  Male who presents with a chief complaint of Dizziness x 2 weeks (13 Jan 2018 19:34)      SUBJECTIVE / OVERNIGHT EVENTS: Pt reports nausea, headache and dizziness.    MEDICATIONS  (STANDING):  aspirin enteric coated 81 milliGRAM(s) Oral daily  atorvastatin 40 milliGRAM(s) Oral at bedtime  enoxaparin Injectable 40 milliGRAM(s) SubCutaneous every 24 hours  influenza   Vaccine 0.5 milliLiter(s) IntraMuscular once  metoclopramide Injectable 10 milliGRAM(s) IV Push once    MEDICATIONS  (PRN):  acetaminophen   Tablet. 650 milliGRAM(s) Oral every 6 hours PRN Moderate Pain (4 - 6), headache      T(C): 36.7 (01-15-18 @ 05:01), Max: 36.7 (01-14-18 @ 16:22)  HR: 56 (01-15-18 @ 05:01) (54 - 57)  BP: 149/99 (01-15-18 @ 05:01) (149/99 - 150/95)  RR: 17 (01-15-18 @ 05:01) (17 - 18)  SpO2: 96% (01-15-18 @ 05:01) (96% - 98%)  CAPILLARY BLOOD GLUCOSE        I&O's Summary      PHYSICAL EXAM:  GENERAL: NAD,   HEAD:  Normocephalic  EYES: EOMI,  conjunctiva and sclera clear  NECK: Supple,   CHEST/LUNG: Clear to auscultation bilaterally; No wheeze  HEART:  s1 s2 + Regular rate and rhythm;   ABDOMEN: Soft, Nontender, Nondistended; Bowel sounds present  EXTREMITIES:   No clubbing, cyanosis  NEURO: AAOx3      LABS:                        16.6   8.84  )-----------( 190      ( 14 Jan 2018 05:32 )             47.6     01-14    142  |  101  |  19  ----------------------------<  109<H>  3.6   |  28  |  1.29    Ca    8.5      14 Jan 2018 05:32  Phos  2.5     01-14  Mg     2.3     01-14    TPro  8.4<H>  /  Alb  4.6  /  TBili  0.8  /  DBili  x   /  AST  13  /  ALT  14  /  AlkPhos  64  01-13          Urinalysis Basic - ( 13 Jan 2018 15:00 )    Color: COLORL / Appearance: CLEAR / SG: > 1.040 / pH: 7.0  Gluc: NEGATIVE / Ketone: NEGATIVE  / Bili: NEGATIVE / Urobili: NORMAL mg/dL   Blood: NEGATIVE / Protein: 20 mg/dL / Nitrite: NEGATIVE   Leuk Esterase: NEGATIVE / RBC: 0-2 / WBC 0-2   Sq Epi: x / Non Sq Epi: x / Bacteria: x

## 2018-01-15 NOTE — OCCUPATIONAL THERAPY INITIAL EVALUATION ADULT - LEVEL OF INDEPENDENCE: SIT/SUPINE, REHAB EVAL
Not assessed. Pt left sitting at edge of bed. No acute distress. Call bell in reach. All lines intact and precautions maintained. RNSUKHDEEP made aware. Wife present bedside.

## 2018-01-15 NOTE — OCCUPATIONAL THERAPY INITIAL EVALUATION ADULT - MD ORDER
Occupational Therapy (OT) to evaluate and treat. Occupational Therapy (OT) to evaluate and treat. Bedrest. Increase as Tolerated. Out of bed with assistance. Per FATUMA Pascual, pt is okay to participate in OT evaluation and perform activity as tolerated.

## 2018-01-15 NOTE — PROGRESS NOTE ADULT - PROBLEM SELECTOR PLAN 4
BP in 150s, will re-start amlodipine at a lower dose of 5mg, hold ARB and torprol for now  Trend BP and adjust medications accordingly

## 2018-01-15 NOTE — PHYSICAL THERAPY INITIAL EVALUATION ADULT - PERTINENT HX OF CURRENT PROBLEM, REHAB EVAL
63 y.o. male admitted 2/2 c/o dizziness and ataxia.  MRI reveals acute Right inferior cerebellar infarct and 2.5 mm downward projecting aneurysm off the distal right M1 segment just distal to a focal area of stenosis along the right M1 segment.

## 2018-01-15 NOTE — OCCUPATIONAL THERAPY INITIAL EVALUATION ADULT - PERTINENT HX OF CURRENT PROBLEM, REHAB EVAL
Pt is a 63 year old male with a history of HTN, who came into [facility] on 1/13/18 with c/o dizziness and ataxia describes the room spinning (since Dec 31st). Pt also reports, nausea, vomiting x 3 episodes in the past 3 days, last episode of vomiting was on 1/12. CT Head No Contrast on 1/13/18 displayed right cerebellar infarct of indeterminate age. Probable chronic left thalamic lacunar infarct.

## 2018-01-15 NOTE — PHYSICAL THERAPY INITIAL EVALUATION ADULT - PLANNED THERAPY INTERVENTIONS, PT EVAL
motor coordination training/strengthening/transfer training/gait training/bed mobility training/balance training

## 2018-01-15 NOTE — OCCUPATIONAL THERAPY INITIAL EVALUATION ADULT - ADDITIONAL COMMENTS
Pt. reports he owns a shower chair; however, pt explains that he was not using it prior to hospitalization.

## 2018-01-16 ENCOUNTER — TRANSCRIPTION ENCOUNTER (OUTPATIENT)
Age: 63
End: 2018-01-16

## 2018-01-16 LAB
BUN SERPL-MCNC: 23 MG/DL — SIGNIFICANT CHANGE UP (ref 7–23)
CALCIUM SERPL-MCNC: 8.6 MG/DL — SIGNIFICANT CHANGE UP (ref 8.4–10.5)
CHLORIDE SERPL-SCNC: 98 MMOL/L — SIGNIFICANT CHANGE UP (ref 98–107)
CO2 SERPL-SCNC: 27 MMOL/L — SIGNIFICANT CHANGE UP (ref 22–31)
CREAT SERPL-MCNC: 1.23 MG/DL — SIGNIFICANT CHANGE UP (ref 0.5–1.3)
GLUCOSE SERPL-MCNC: 119 MG/DL — HIGH (ref 70–99)
HCT VFR BLD CALC: 46.6 % — SIGNIFICANT CHANGE UP (ref 39–50)
HGB BLD-MCNC: 16.2 G/DL — SIGNIFICANT CHANGE UP (ref 13–17)
MAGNESIUM SERPL-MCNC: 2.3 MG/DL — SIGNIFICANT CHANGE UP (ref 1.6–2.6)
MCHC RBC-ENTMCNC: 30.1 PG — SIGNIFICANT CHANGE UP (ref 27–34)
MCHC RBC-ENTMCNC: 34.8 % — SIGNIFICANT CHANGE UP (ref 32–36)
MCV RBC AUTO: 86.6 FL — SIGNIFICANT CHANGE UP (ref 80–100)
NRBC # FLD: 0 — SIGNIFICANT CHANGE UP
PLATELET # BLD AUTO: 206 K/UL — SIGNIFICANT CHANGE UP (ref 150–400)
PMV BLD: 11.1 FL — SIGNIFICANT CHANGE UP (ref 7–13)
POTASSIUM SERPL-MCNC: 3.5 MMOL/L — SIGNIFICANT CHANGE UP (ref 3.5–5.3)
POTASSIUM SERPL-SCNC: 3.5 MMOL/L — SIGNIFICANT CHANGE UP (ref 3.5–5.3)
RBC # BLD: 5.38 M/UL — SIGNIFICANT CHANGE UP (ref 4.2–5.8)
RBC # FLD: 12.6 % — SIGNIFICANT CHANGE UP (ref 10.3–14.5)
SODIUM SERPL-SCNC: 140 MMOL/L — SIGNIFICANT CHANGE UP (ref 135–145)
WBC # BLD: 7.91 K/UL — SIGNIFICANT CHANGE UP (ref 3.8–10.5)
WBC # FLD AUTO: 7.91 K/UL — SIGNIFICANT CHANGE UP (ref 3.8–10.5)

## 2018-01-16 PROCEDURE — 99233 SBSQ HOSP IP/OBS HIGH 50: CPT | Mod: GC

## 2018-01-16 PROCEDURE — 99233 SBSQ HOSP IP/OBS HIGH 50: CPT

## 2018-01-16 RX ORDER — CLOPIDOGREL BISULFATE 75 MG/1
75 TABLET, FILM COATED ORAL DAILY
Qty: 0 | Refills: 0 | Status: DISCONTINUED | OUTPATIENT
Start: 2018-01-16 | End: 2018-01-17

## 2018-01-16 RX ORDER — METOCLOPRAMIDE HCL 10 MG
10 TABLET ORAL EVERY 8 HOURS
Qty: 0 | Refills: 0 | Status: COMPLETED | OUTPATIENT
Start: 2018-01-16 | End: 2018-01-17

## 2018-01-16 RX ORDER — METOCLOPRAMIDE HCL 10 MG
10 TABLET ORAL ONCE
Qty: 0 | Refills: 0 | Status: COMPLETED | OUTPATIENT
Start: 2018-01-16 | End: 2018-01-16

## 2018-01-16 RX ORDER — AMLODIPINE BESYLATE 2.5 MG/1
10 TABLET ORAL DAILY
Qty: 0 | Refills: 0 | Status: DISCONTINUED | OUTPATIENT
Start: 2018-01-17 | End: 2018-01-18

## 2018-01-16 RX ORDER — ATORVASTATIN CALCIUM 80 MG/1
80 TABLET, FILM COATED ORAL AT BEDTIME
Qty: 0 | Refills: 0 | Status: DISCONTINUED | OUTPATIENT
Start: 2018-01-16 | End: 2018-01-18

## 2018-01-16 RX ADMIN — Medication 81 MILLIGRAM(S): at 11:15

## 2018-01-16 RX ADMIN — AMLODIPINE BESYLATE 5 MILLIGRAM(S): 2.5 TABLET ORAL at 05:46

## 2018-01-16 RX ADMIN — CLOPIDOGREL BISULFATE 75 MILLIGRAM(S): 75 TABLET, FILM COATED ORAL at 17:51

## 2018-01-16 RX ADMIN — ATORVASTATIN CALCIUM 80 MILLIGRAM(S): 80 TABLET, FILM COATED ORAL at 22:35

## 2018-01-16 RX ADMIN — Medication 10 MILLIGRAM(S): at 15:45

## 2018-01-16 RX ADMIN — ENOXAPARIN SODIUM 40 MILLIGRAM(S): 100 INJECTION SUBCUTANEOUS at 22:35

## 2018-01-16 NOTE — CONSULT NOTE ADULT - SUBJECTIVE AND OBJECTIVE BOX
Patient is a 63y old  Male who presents with a chief complaint of Dizziness x 2 weeks (16 Jan 2018 09:05)          HPI:  63 year old male with PMH of HTN presented with dizziness found to have acute CVA.  Telemetry EKG recorded NSR without evidence of PAF.  Patient is scheduled for   EROS tomorrow.  Patient denies palpitations.       PAST MEDICAL & SURGICAL HISTORY:  Hypertension  No significant past surgical history      MEDICATIONS  (STANDING):  aspirin enteric coated 81 milliGRAM(s) Oral daily  atorvastatin 80 milliGRAM(s) Oral at bedtime  clopidogrel Tablet 75 milliGRAM(s) Oral daily  enoxaparin Injectable 40 milliGRAM(s) SubCutaneous every 24 hours  influenza   Vaccine 0.5 milliLiter(s) IntraMuscular once    MEDICATIONS  (PRN):  acetaminophen   Tablet. 650 milliGRAM(s) Oral every 6 hours PRN Moderate Pain (4 - 6), headache    Allergies    No Known Allergies    Intolerances      FAMILY HISTORY:  No pertinent family history in first degree relatives      SOCIAL HISTORY:  Denies smoking; no   Alcohol  or  Drug abuse     REVIEW OF SYSTEMS:    CONSTITUTIONAL: No fever, weight loss, chills, shakes, or fatigue  EYES: No eye pain, visual disturbances, or discharge  ENMT:  No difficulty hearing, tinnitus, vertigo; No sinus or throat pain  NECK: No pain or stiffness  RESPIRATORY: No cough, wheezing, hemoptysis, or shortness of breath  CARDIOVASCULAR: No chest pain, dyspnea, palpitations, syncope, paroxysmal nocturnal dyspnea, orthopnea, or arm or leg swelling +dizziness,   GASTROINTESTINAL: No abdominal  or epigastric pain, nausea, vomiting, hematemesis, diarrhea, constipation, melena or bright red blood.  GENITOURINARY: No dysuria, nocturia, hematuria, or urinary incontinence  NEUROLOGICAL: No headaches, memory loss, slurred speech, limb weakness, loss of strength, numbness, or tremors  SKIN: No itching, burning, rashes, or lesions   LYMPH NODES: No enlarged glands  ENDOCRINE: No heat or cold intolerance, or hair loss  MUSCULOSKELETAL: No joint pain or swelling, muscle, back, or extremity pain  PSYCHIATRIC: No depression, anxiety, or difficulty sleeping  HEME/LYMPH: No easy bruising or bleeding gums  ALLERY AND IMMUNOLOGIC: No hives or rash.      Vital Signs Last 24 Hrs  T(C): 36.9 (16 Jan 2018 15:47), Max: 36.9 (16 Jan 2018 04:58)  T(F): 98.5 (16 Jan 2018 15:47), Max: 98.5 (16 Jan 2018 15:47)  HR: 60 (16 Jan 2018 15:47) (60 - 63)  BP: 169/94 (16 Jan 2018 15:47) (153/98 - 169/94)  BP(mean): --  RR: 17 (16 Jan 2018 15:47) (17 - 17)  SpO2: 95% (16 Jan 2018 15:47) (95% - 97%)    PHYSICAL EXAM:    GENERAL: In no apparent distress, well nourished, and hydrated.  HEAD:  Atraumatic, Normocephalic  NECK: Supple . No JVD or carotid bruit or thyroidmegaly.  Carotid pulse is 2+ bilaterally.  PULMONARY: Clear to auscultation and perfusion.  No rales, wheezing, or rhonchi bilaterally.  HEART: Regular rate and rhythm; No murmurs, rubs, or gallops.  ABDOMEN: Soft, Nontender, Nondistended; Bowel sounds present  EXTREMITIES: No clubbing, cyanosis, or edema  NEUROLOGICAL: Alert oriented to person, place and time.  Speech clear.  Skin: Dry intact, no rashes or lesions.          INTERPRETATION OF TELEMETRY:  Normal Sinus rhythm 70 bpm     ECG:  Normal Sinus rhythm 70 bpm           LABS:                        16.2   7.91  )-----------( 206      ( 16 Jan 2018 05:22 )             46.6     01-16    140  |  98  |  23  ----------------------------<  119<H>  3.5   |  27  |  1.23    Ca    8.6      16 Jan 2018 05:22  Mg     2.3     01-16                MRI of head: Impression:    Acute right inferior cerebellar infarct corresponding with a lucency seen   on brain CT.  Chronic left thalamic lacunar infarct.  Mild microvascular ischemic change.          PREVIOUS DIAGNOSTIC TESTING:      ECHO FINDINGS:    STRESS FINDINGS:    CATHETERIZATION FINDINGS:

## 2018-01-16 NOTE — DISCHARGE NOTE ADULT - PLAN OF CARE
Resolution of symptoms Follow with PMD within 1 week. Call for an appointment  Take medications as prescribed Satisfactory blood pressures Follow with PMD  Take medications as prescribed satisfactory glucose levels Follow with PMD

## 2018-01-16 NOTE — SWALLOW BEDSIDE ASSESSMENT ADULT - COMMENTS
Patient alert and cooperative during today's evaluation.  Patient seated upright in bed, able to communicate wants and needs effectively.  Vocal quality clear and strong with no coughing, wet quality or throat clearing at baseline. Patient alert and cooperative during today's evaluation.  Patient seated upright in bed, able to communicate wants and needs effectively.  Vocal quality clear and strong with no coughing, wet quality or throat clearing at baseline.    MR of the brain on 1/14/2018: "FINDINGS: There is an acute right inferior cerebellar infarct in the distribution of the right posterior inferior cerebellar artery. There is no significant mass effect upon the fourth ventricle. There is no evidence for hemorrhagic conversion.   Age-appropriate involutional changes and mild microvascular ischemic change are noted. There is a chronic left thalamic lacunar infarct."

## 2018-01-16 NOTE — DISCHARGE NOTE ADULT - HOME CARE AGENCY
[facility] for RN, PT & home health aid services.  Please call [phone] with questions/concerns.

## 2018-01-16 NOTE — DISCHARGE NOTE ADULT - HOSPITAL COURSE
63M with 2 weeks of worsening dizziness (room is spinning) refractory to meclizine (given by OSH 2 weeks ago). Patient notes dizziness is worse w/ movement. ROS also revealed gait ataxia (left-sided preference) and N/V, admitrted for CVA workup.  NIHSS 0 MRS 0    + Acute right inferior cerebellar infarct corresponding with a lucency seen on brain CT.  + Cerebral aneurysm, nonruptured: outpt NeuroSx f/u  +  HTN: re-started on lower dose of amlodipine on 1/15, holding ARB and Toprol for now  CT Head= Right cerebellar infarct of indeterminate age. Brain MRI follow-up recommended. Probable chronic left thalamic lacunar infarct.    CTA HEAD: Nonvisualization and likely occlusion of the right posterior inferior cerebellar artery. This correlates to patient's right cerebellar infarct seen on previous head CT. Variant cerebral arterial anatomy as noted above. 2.5 mm downward projecting aneurysm off the distal right M1 segment just distal to a focal area of stenosis along the right M1 segment..    CTA NECK: Patent cervical vasculature. No flow limiting stenosis or occlusion by NASCET criteria.   Permissive HTN, -180  H &H = 18.2/ 53.9  BUN/ Creatinine= 20/ 1.32  Glucose= 157  UA = Clear   1/14 MRbrain - Acute right inferior cerebellar infarct corresponding with a lucency seen   on brain CT.  Chronic left thalamic lacunar infarct.  Mild microvascular ischemic change.  1/17 - EROS - min MR, min AR, mid nonmobile atherosclerotic plaque in aortic arch and descending thoracic aorta, no thrombus, normal LV wall thicknes,normal  LV fxn, normal RV fxn, mild diastolic dysfunction, no PFO 63M  with a history of HTN, came in c/o dizziness and ataxia describes a the room spinning (since Dec 31st) , pt also reports, nausea, vomiting x 3 episodes in the past 3 days, last episode of vomiting was on 1/12. Two weeks ago, the pt went to [facility] when the symptoms began.  He was diagnosed with Vertigo and placed on Meclizine. The symptoms did not resolve with Meclizine, so the pt came to [facility]. The pt denies falls, tinnitus, vison changes, chest pain, palpitation, SOB, weakness, chills, diaphoresis, dysuria. In the ED, the pt had a CTH that revealed Right cerebellar infarct of indeterminate age. Brain MRI follow-up recommended. Probable chronic left thalamic lacunar infarct.  CTA Head showed 2.5 mm downward projecting aneurysm off the distal right M1 segment just distal to a focal area of stenosis along the right M1 segment.  The pt was seen by Neurology. Their consult and recommendations are in the chart.  Neurosurgery was called for consult on the 2.5 mm downward projecting aneurysm. Currently the pt still experiencing exertional dizziness and ataxia.pt admitted for    # Acute CVA (cerebrovascular accident)- NIHSS 0 MRS 0   MRI with Acute right inferior cerebellar infarct corresponding with a lucency seen on brain CT. Chronic left thalamic lacunar infarct. Mild microvascular ischemic change.CT Angio showing nonvisualization and likely occlusion of the right posterior inferior cerebellar artery  c/w Aspirin and clopidogrel for 3 weeks followed by aspirin for secondary stroke prevention, increase Lipitor to 80 mg   neuro recommends TTE/EROS/ILR. Pt declined ILR, opted for 30 days event monitor  EROS- No LV thrombus. SS- regular diet       #  Cerebral artery aneurysm- pt noted to have incidental finding of mild narrowing along the distal right middle cerebral artery with   punctate calcification and small downward projecting aneurysm measuring approximately 2.1 mm  Neurosurgery recs reviewed- Advised  Outpatient follow up with Dr Betts [phone].  Repeat MRA head in about 6 months to follow up on aneurysm and follow up with NSGY for the same.     #  Vertigo as late effect of stroke.  Plan: continue supportive care. Meclizine PRN if helps.     #  Diabetes.  Plan: A1 C 7 Pt not previously on medications  Diabetes education ordered  I discussed with pt options for PO meds - He reports he knew he had borderline DM and wants to try diet and lifestyle modifications before taking meds. I explained to him the benefit of tight glycemic control in the setting of a CVA but he still opts for diet and lifestyle change. He will FU with his PMD in 4-6 weeks to discuss further.    # Essential hypertension.  Plan: BP in 150s  Amlodipine increased to 10 mg , Pt started on low dose  Losartan( therapeutic exchange for Home med- Telmisartan) and home dose metoprolol   Trend BP and adjust medications accordingly  Pt explained to FU with PMD within 4-5 days to monitor SBP and readjust meds   Dispo - pt medically optimized for DC  DC planning 50 mins.    PT to FU WIth PMD,Neurology and EP

## 2018-01-16 NOTE — DISCHARGE NOTE ADULT - CARE PROVIDER_API CALL
Dr Braga,   Phone: (   )    -  Fax: (   )    - Adelso Clifford  Phone: [phone]  Fax: (       - Adelso Clifford  Phone: [phone]  Fax: (   )    -    Alexander Torres (MBBS), Neurology; Vascular Neurology  [address] [address]  Phone: [phone]  Fax: [phone] Alexander Torres (ESTELA), Neurology; Vascular Neurology  [address] [facility] [address]  [place] [address]  Phone: [phone]  Fax: [phone]    Adelso Clifford  Phone: [phone]  Fax: (   )    -    Haider Martines), Cardiac Electrophysiology; Cardiovascular Disease; Internal Medicine  [address] [place] [address] [id number] [place] [id number]  Phone: [phone]  Fax: [phone]

## 2018-01-16 NOTE — PROGRESS NOTE ADULT - SUBJECTIVE AND OBJECTIVE BOX
Patient is a 63y old  Male who presents with a chief complaint of Dizziness x 2 weeks (13 Jan 2018 19:34)      SUBJECTIVE / OVERNIGHT EVENTS: pt seen and examined by me     MEDICATIONS  (STANDING):  amLODIPine   Tablet 5 milliGRAM(s) Oral daily  aspirin enteric coated 81 milliGRAM(s) Oral daily  atorvastatin 40 milliGRAM(s) Oral at bedtime  enoxaparin Injectable 40 milliGRAM(s) SubCutaneous every 24 hours  influenza   Vaccine 0.5 milliLiter(s) IntraMuscular once    MEDICATIONS  (PRN):  acetaminophen   Tablet. 650 milliGRAM(s) Oral every 6 hours PRN Moderate Pain (4 - 6), headache    Vital Signs Last 24 Hrs  T(C): 36.9 (16 Jan 2018 04:58), Max: 37.2 (15 Yousuf 2018 12:32)  T(F): 98.4 (16 Jan 2018 04:58), Max: 99 (15 Yousuf 2018 12:32)  HR: 63 (16 Jan 2018 04:58) (60 - 63)  BP: 153/98 (16 Jan 2018 04:58) (135/95 - 158/94)  BP(mean): --  RR: 17 (16 Jan 2018 04:58) (17 - 18)  SpO2: 96% (16 Jan 2018 04:58) (96% - 97%)    I&O's Summary      PHYSICAL EXAM:  GENERAL: NAD,   HEAD:  Normocephalic  EYES: EOMI,  conjunctiva and sclera clear  NECK: Supple,   CHEST/LUNG: Clear to auscultation bilaterally; No wheeze  HEART:  s1 s2 + Regular rate and rhythm;   ABDOMEN: Soft, Nontender, Nondistended; Bowel sounds present  EXTREMITIES:   No clubbing, cyanosis  NEURO: AAOx3      LABS:                                 16.2   7.91  )-----------( 206      ( 16 Jan 2018 05:22 )             46.6       01-16    140  |  98  |  23  ----------------------------<  119<H>  3.5   |  27  |  1.23    Ca    8.6      16 Jan 2018 05:22  Mg     2.3     01-16        Urinalysis Basic - ( 13 Jan 2018 15:00 )    Color: COLORL / Appearance: CLEAR / SG: > 1.040 / pH: 7.0  Gluc: NEGATIVE / Ketone: NEGATIVE  / Bili: NEGATIVE / Urobili: NORMAL mg/dL   Blood: NEGATIVE / Protein: 20 mg/dL / Nitrite: NEGATIVE   Leuk Esterase: NEGATIVE / RBC: 0-2 / WBC 0-2   Sq Epi: x / Non Sq Epi: x / Bacteria: x Patient is a 63y old  Male who presents with a chief complaint of Dizziness x 2 weeks (13 Jan 2018 19:34)      SUBJECTIVE / OVERNIGHT EVENTS: pt seen and examined by me Feels well  Reports 80% improvement in symptoms  Asking if he can go home     MEDICATIONS  (STANDING):  amLODIPine   Tablet 5 milliGRAM(s) Oral daily  aspirin enteric coated 81 milliGRAM(s) Oral daily  atorvastatin 40 milliGRAM(s) Oral at bedtime  enoxaparin Injectable 40 milliGRAM(s) SubCutaneous every 24 hours  influenza   Vaccine 0.5 milliLiter(s) IntraMuscular once    MEDICATIONS  (PRN):  acetaminophen   Tablet. 650 milliGRAM(s) Oral every 6 hours PRN Moderate Pain (4 - 6), headache    Vital Signs Last 24 Hrs  T(C): 36.9 (16 Jan 2018 04:58), Max: 37.2 (15 Yousuf 2018 12:32)  T(F): 98.4 (16 Jan 2018 04:58), Max: 99 (15 Yousuf 2018 12:32)  HR: 63 (16 Jan 2018 04:58) (60 - 63)  BP: 153/98 (16 Jan 2018 04:58) (135/95 - 158/94)  BP(mean): --  RR: 17 (16 Jan 2018 04:58) (17 - 18)  SpO2: 96% (16 Jan 2018 04:58) (96% - 97%)    I&O's Summary      PHYSICAL EXAM:  GENERAL: NAD,   HEAD:  Normocephalic  EYES: EOMI,  conjunctiva and sclera clear  NECK: Supple,   CHEST/LUNG: Clear to auscultation bilaterally; No wheeze  HEART:  s1 s2 + Regular rate and rhythm;   ABDOMEN: Soft, Nontender, Nondistended; Bowel sounds present  EXTREMITIES:   No clubbing, cyanosis  NEURO: AAOx3      LABS:                                 16.2   7.91  )-----------( 206      ( 16 Jan 2018 05:22 )             46.6       01-16    140  |  98  |  23  ----------------------------<  119<H>  3.5   |  27  |  1.23    Ca    8.6      16 Jan 2018 05:22  Mg     2.3     01-16        Urinalysis Basic - ( 13 Jan 2018 15:00 )    Color: COLORL / Appearance: CLEAR / SG: > 1.040 / pH: 7.0  Gluc: NEGATIVE / Ketone: NEGATIVE  / Bili: NEGATIVE / Urobili: NORMAL mg/dL   Blood: NEGATIVE / Protein: 20 mg/dL / Nitrite: NEGATIVE   Leuk Esterase: NEGATIVE / RBC: 0-2 / WBC 0-2   Sq Epi: x / Non Sq Epi: x / Bacteria: x

## 2018-01-16 NOTE — PROGRESS NOTE ADULT - PROBLEM SELECTOR PLAN 4
BP in 130- 150s,   On amlodipine  5mg, will increase to 10 mg   Trend BP and adjust medications accordingly   Hold ARB and Toprol for now

## 2018-01-16 NOTE — PROGRESS NOTE ADULT - SUBJECTIVE AND OBJECTIVE BOX
Neurology progress note     Name: DEBORAH KENNEDY    Interval history : Patient's symptoms of dizziness improving.     HPI: 64 yo right-handed  man who presents to [facility] w/ 2 week onset of worsening dizziness (room is spinning) refractory to meclizine (given by OSH 2 weeks ago). Patient notes dizziness is worse w/ movement. ROS also revealed gait ataxia (left-sided preference) and N/V, otherwise unremarkable for focal weakness, numbness/tingling, acute vision changes, chest pain, fever, URI symptoms, tinnitus. 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 Patient's SBP > 190 on admission. tPA not given, patient outside recommended window. NIHSS 0 MRS 0    PMH/PSH:   HTN (poorly controlled)   HLD     MEDICATIONS  (STANDING):  aspirin  chewable 81 milliGRAM(s) Oral once  atorvastatin 40 milliGRAM(s) Oral once    Allergies: No Known Allergies    Objective:   Vital Signs Last 24 Hrs  T(C): 36.7 (13 Jan 2018 11:12), Max: 36.7 (13 Jan 2018 11:12)  T(F): 98 (13 Jan 2018 11:12), Max: 98 (13 Jan 2018 11:12)  HR: 60 (13 Jan 2018 13:11) (55 - 60)  BP: 143/79 (13 Jan 2018 13:11) (143/79 - 192/100)  BP(mean): --  RR: 18 (13 Jan 2018 13:11) (18 - 20)  SpO2: 100% (13 Jan 2018 13:11) (100% - 100%)    General Exam:   General appearance: No acute distress                   Neurological Exam:  Mental Status: AAOx3, fluent speech, follows commands    Cranial Nerves: EOMI , horizontal nystagmus on rightward gaze , PERRL, V1-V3 intact, facial symmetry intact, no dysarthria, tongue midline, VFF    Motor: 5/5 throughout. No drift x4    Sensation: Intact to LT throughout    Coordination: FTN abnormal on right HTS intact b/l,      Reflexes: 1+ bilateral biceps, brachioradialis, patellar and ankle, Babinski absent (toes downgoing b/l)     Gait: unstable gait (left-sided lean)      Labs:    01-13    144  |  99  |  20  ----------------------------<  151<H>  4.3   |  32<H>  |  1.32<H>    Ca    9.2      13 Jan 2018 12:45    TPro  8.4<H>  /  Alb  4.6  /  TBili  0.8  /  DBili  x   /  AST  13  /  ALT  14  /  AlkPhos  64  01-13    LIVER FUNCTIONS - ( 13 Jan 2018 12:45 )  Alb: 4.6 g/dL / Pro: 8.4 g/dL / ALK PHOS: 64 u/L / ALT: 14 u/L / AST: 13 u/L / GGT: x           CBC Full  -  ( 13 Jan 2018 12:45 )  WBC Count : 8.16 K/uL  Hemoglobin : 18.2 g/dL  Hematocrit : 53.9 %  Platelet Count - Automated : 213 K/uL  Mean Cell Volume : 88.5 fL  Mean Cell Hemoglobin : 29.9 pg  Mean Cell Hemoglobin Concentration : 33.8 %  Auto Neutrophil # : 5.91 K/uL  Auto Lymphocyte # : 1.82 K/uL  Auto Monocyte # : 0.36 K/uL  Auto Eosinophil # : 0.02 K/uL  Auto Basophil # : 0.02 K/uL  Auto Neutrophil % : 72.5 %  Auto Lymphocyte % : 22.3 %  Auto Monocyte % : 4.4 %  Auto Eosinophil % : 0.2 %  Auto Basophil % : 0.2 %      Radiology  < from: CT Head No Cont (01.13.18 @ 13:04) >  Impression:    Right cerebellar infarct of indeterminate age. Brain MRI follow-up is   recommended.  Probable chronic left thalamic lacunar infarct.    < end of copied text >  < from: CT Angio Neck w/ IV Cont (01.13.18 @ 14:32) >  CTA HEAD: Nonvisualization and likely occlusion of the right posterior   inferior cerebellar artery. This correlates to patient's right cerebellar   infarct seen on previous head CT. Variant cerebral arterial anatomy as   noted above.  2.5 mm downwardprojecting aneurysm off the distal right M1 segment just   distal to a focal area of stenosis along the right M1 segment..    < end of copied text > Neurology progress note     Name: DEBORAH KENNEDY    Interval history : Patient's symptoms of dizziness improving.     HPI: 62 yo right-handed  man who presents to [facility] w/ 2 week onset of worsening dizziness (room is spinning) refractory to meclizine (given by OSH 2 weeks ago). Patient notes dizziness is worse w/ movement. ROS also revealed gait ataxia (left-sided preference) and N/V, otherwise unremarkable for focal weakness, numbness/tingling, acute vision changes, chest pain, fever, URI symptoms, tinnitus. 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 Patient's SBP > 190 on admission. tPA not given, patient outside recommended window. NIHSS 0 MRS 0    PMH/PSH:   HTN (poorly controlled)   HLD     MEDICATIONS  (STANDING):  aspirin  chewable 81 milliGRAM(s) Oral once  atorvastatin 40 milliGRAM(s) Oral once    Allergies: No Known Allergies    ICU Vital Signs Last 24 Hrs  T(C): 36.9 (16 Jan 2018 04:58), Max: 36.9 (16 Jan 2018 04:58)  T(F): 98.4 (16 Jan 2018 04:58), Max: 98.4 (16 Jan 2018 04:58)  HR: 63 (16 Jan 2018 04:58) (62 - 63)  BP: 153/98 (16 Jan 2018 04:58) (135/95 - 158/94)  BP(mean): --  ABP: --  ABP(mean): --  RR: 17 (16 Jan 2018 04:58) (17 - 17)  SpO2: 96% (16 Jan 2018 04:58) (96% - 97%)        General Exam:   General appearance: No acute distress                   Neurological Exam:  Mental Status: AAOx3, fluent speech, follows commands    Cranial Nerves: EOMI , horizontal nystagmus on rightward gaze , PERRL, V1-V3 intact, facial symmetry intact, no dysarthria, tongue midline, VFF    Motor: 5/5 throughout. No drift x4    Sensation: Intact to LT throughout    Coordination: FTN abnormal on right HTS intact b/l,      Reflexes: 1+ bilateral biceps, brachioradialis, patellar and ankle, Babinski absent (toes downgoing b/l)     Gait: unstable gait (left-sided lean)      Labs:                          16.2   7.91  )-----------( 206      ( 16 Jan 2018 05:22 )             46.6   01-16    140  |  98  |  23  ----------------------------<  119<H>  3.5   |  27  |  1.23    Ca    8.6      16 Jan 2018 05:22  Mg     2.3     01-16        Radiology  < from: CT Head No Cont (01.13.18 @ 13:04) >  Impression:    Right cerebellar infarct of indeterminate age. Brain MRI follow-up is   recommended.  Probable chronic left thalamic lacunar infarct.    < end of copied text >  < from: CT Angio Neck w/ IV Cont (01.13.18 @ 14:32) >  CTA HEAD: Nonvisualization and likely occlusion of the right posterior   inferior cerebellar artery. This correlates to patient's right cerebellar   infarct seen on previous head CT. Variant cerebral arterial anatomy as   noted above.  2.5 mm downwardprojecting aneurysm off the distal right M1 segment just   distal to a focal area of stenosis along the right M1 segment..    < end of copied text >        < from: MR Head No Cont (01.14.18 @ 14:02) >  Acute right inferior cerebellar infarct corresponding with a lucency seen   on brain CT.    < end of copied text > Neurology progress note     Name: DEBORAH KENNEDY    Interval history : Patient's symptoms of dizziness improving. No new neurological symptoms and no events overnight.     HPI: 64 yo right-handed  man who presents to [facility] w/ 2 week onset of worsening dizziness (room is spinning) refractory to meclizine (given by OSH 2 weeks ago). Patient notes dizziness is worse w/ movement. ROS also revealed gait ataxia (left-sided preference) and N/V, otherwise unremarkable for focal weakness, numbness/tingling, acute vision changes, chest pain, fever, URI symptoms, tinnitus. 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 Patient's SBP > 190 on admission. tPA not given, patient outside recommended window. NIHSS 0 MRS 0    ROS: All negative except documented above.     PMH/PSH:   HTN (poorly controlled)   HLD     MEDICATIONS  (STANDING):  aspirin  chewable 81 milliGRAM(s) Oral once  atorvastatin 40 milliGRAM(s) Oral once    Allergies: No Known Allergies    ICU Vital Signs Last 24 Hrs  T(C): 36.9 (16 Jan 2018 04:58), Max: 36.9 (16 Jan 2018 04:58)  T(F): 98.4 (16 Jan 2018 04:58), Max: 98.4 (16 Jan 2018 04:58)  HR: 63 (16 Jan 2018 04:58) (62 - 63)  BP: 153/98 (16 Jan 2018 04:58) (135/95 - 158/94)  BP(mean): --  ABP: --  ABP(mean): --  RR: 17 (16 Jan 2018 04:58) (17 - 17)  SpO2: 96% (16 Jan 2018 04:58) (96% - 97%)        General Exam:   General appearance: No acute distress                   Neurological Exam:  Mental Status: AAOx3, fluent speech, follows commands    Cranial Nerves: EOMI , horizontal nystagmus on rightward gaze , PERRL, V1-V3 intact, facial symmetry intact, no dysarthria, tongue midline, VFF    Motor: 5/5 throughout. No drift x4    Sensation: Intact to LT throughout    Coordination: FTN abnormal on right HTS intact b/l,      Reflexes: 1+ bilateral biceps, brachioradialis, patellar and ankle, Babinski absent (toes downgoing b/l)     Gait: unstable gait (left-sided lean)      Labs:                          16.2   7.91  )-----------( 206      ( 16 Jan 2018 05:22 )             46.6   01-16    140  |  98  |  23  ----------------------------<  119<H>  3.5   |  27  |  1.23    Ca    8.6      16 Jan 2018 05:22  Mg     2.3     01-16        Radiology  < from: CT Head No Cont (01.13.18 @ 13:04) >  Impression:    Right cerebellar infarct of indeterminate age. Brain MRI follow-up is   recommended.  Probable chronic left thalamic lacunar infarct.    < end of copied text >  < from: CT Angio Neck w/ IV Cont (01.13.18 @ 14:32) >  CTA HEAD: Nonvisualization and likely occlusion of the right posterior   inferior cerebellar artery. This correlates to patient's right cerebellar   infarct seen on previous head CT. Variant cerebral arterial anatomy as   noted above.  2.5 mm downwardprojecting aneurysm off the distal right M1 segment just   distal to a focal area of stenosis along the right M1 segment..    < end of copied text >        < from: MR Head No Cont (01.14.18 @ 14:02) >  Acute right inferior cerebellar infarct corresponding with a lucency seen   on brain CT.    < end of copied text >

## 2018-01-16 NOTE — SWALLOW BEDSIDE ASSESSMENT ADULT - SWALLOW EVAL: DIAGNOSIS
Pt. presents with adequate oral stage skills for puree, solids and thin liquids marked by adequate bolus acceptance, formation, transfer and clearance.  Pharyngeal stage of swallow for puree, solids, and thin liquids marked by timely swallow trigger, adequate hyolaryngeal elevation and no overt s/s of penetration/aspiration.

## 2018-01-16 NOTE — PROGRESS NOTE ADULT - PROBLEM SELECTOR PLAN 5
VTE with Lovenox 40 mg sub cut daily.  . VTE with Lovenox 40 mg sub cut daily.  Dispo - Await TTE.  DC planning 50 mins

## 2018-01-16 NOTE — DISCHARGE NOTE ADULT - CARE PROVIDERS DIRECT ADDRESSES
,DirectAddress_Unknown ,DirectAddress_Unknown,[email or web address] ,[email or web address],DirectAddress_Unknown,[email or web address]

## 2018-01-16 NOTE — PROGRESS NOTE ADULT - ASSESSMENT
62 yo right-handed  man who presents to [facility] w/ 2 week onset of worsening dizziness (room is spinning) refractory to meclizine (given by OSH 2 weeks ago). Patient notes dizziness is worse w/ movement. ROS also revealed gait ataxia (left-sided preference) and N/V, otherwise unremarkable for focal weakness, numbness/tingling, acute vision changes, chest pain, fever, URI symptoms, tinnitus. 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 Patient's SBP > 190 on admission. tPA not given, patient outside recommended window. NIHSS 0 MRS 0 64 yo right-handed  man who presents to [facility] w/ 2 week onset of worsening dizziness (room is spinning) refractory to meclizine (given by OSH 2 weeks ago). Patient notes dizziness is worse w/ movement. ROS also revealed gait ataxia (left-sided preference) and N/V, otherwise unremarkable for focal weakness, numbness/tingling, acute vision changes, chest pain, fever, URI symptoms, tinnitus. 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 Patient's SBP > 190 on admission. tPA not given, patient outside recommended window. NIHSS 0 MRS 0. 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  If you have any questions, please feel free [place] [phone]. 63 years old man with multiple vascular risk factors including HTN, HPLD and DM II was admitted to [facility] for evaluation of dizziness/vertiginous sensation of about 2 weeks duration. MRI brain showed right PICA distribution with infarct. CTA head and neck to my eye showed right PICA occlusion in the form of a stump without any evidence of symptomatic intracranial or extracranial large vessel severe stenosis or occlusion as well as small 2.5 mm right MCA M1 segment aneurysm.     Impression:  Cerebral embolism with cerebral infarction. Right PICA distribution stroke - likely etiology being embolism from a proximal source like cardiac/neurological source of embolism    Plan:  - Aspirin and clopidogrel for 3 weeks followed by aspirin for secondary stroke prevention  -  - atorvastatin 80 mg and bedtime, HbA1c 7.0  - Transesophageal echocardiogram to rule out any structural cardiac source of embolism  - Consider prosthetic monitoring with ICM to screen for any evidence of focal cardiac arrhythmias like atrial fibrillation being the cardiac source of embolism as it could potentially change the measures of secondary stroke prevention in his case  - Continue with aggressive vascular risk factors modification  - PT/OT/speech and swallow evaluation    Above-mentioned plan was discussed with patient in detail. All the questions were answered and concerns were addressed. 63 years old man with multiple vascular risk factors including HTN, HPLD and DM II was admitted to [facility] for evaluation of dizziness/vertiginous sensation of about 2 weeks duration. MRI brain showed right PICA distribution with infarct. CTA head and neck to my eye showed right PICA occlusion in the form of a stump without any evidence of symptomatic intracranial or extracranial large vessel severe stenosis or occlusion as well as small 2.5 mm right MCA M1 segment aneurysm.     Impression:  Cerebral embolism with cerebral infarction. Right PICA distribution stroke - likely etiology being embolism from a proximal source like cardiac/neurological source of embolism    Plan:  - Aspirin and clopidogrel for 3 weeks followed by aspirin for secondary stroke prevention  -  - atorvastatin 80 mg and bedtime, HbA1c 7.0  - Transesophageal echocardiogram to rule out any structural cardiac source of embolism  - Consider prosthetic monitoring with ICM to screen for any evidence of focal cardiac arrhythmias like atrial fibrillation being the cardiac source of embolism as it could potentially change the measures of secondary stroke prevention in his case  - Continue with aggressive vascular risk factors modification  - PT/OT/speech and swallow evaluation  - Repeat MRA head in about 6 months to follow up on aneurysm and follow up with NSGY for the same     Above-mentioned plan was discussed with patient in detail. All the questions were answered and concerns were addressed.

## 2018-01-16 NOTE — DISCHARGE NOTE ADULT - OTHER SIGNIFICANT FINDINGS
CT Head= Right cerebellar infarct of indeterminate age. Brain MRI follow-up recommended. Probable chronic left thalamic lacunar infarct.    CTA HEAD: Nonvisualization and likely occlusion of the right posterior inferior cerebellar artery. This correlates to patient's right cerebellar infarct seen on previous head CT. Variant cerebral arterial anatomy as noted above. 2.5 mm downward projecting aneurysm off the distal right M1 segment just distal to a focal area of stenosis along the right M1 segment..    CTA NECK: Patent cervical vasculature. No flow limiting stenosis or occlusion by NASCET criteria.   Permissive HTN, -180  H &H = 18.2/ 53.9  BUN/ Creatinine= 20/ 1.32  Glucose= 157  UA = Clear   1/14 MRbrain - Acute right inferior cerebellar infarct corresponding with a lucency seen   on brain CT.  Chronic left thalamic lacunar infarct.  Mild microvascular ischemic change.  1/17 - EROS - min MR, min AR, mid nonmobile atherosclerotic plaque in aortic arch and descending thoracic aorta, no thrombus, normal LV wall thicknes,normal  LV fxn, normal RV fxn, mild diastolic dysfunction, no PFO

## 2018-01-16 NOTE — SWALLOW BEDSIDE ASSESSMENT ADULT - SLP PERTINENT HISTORY OF CURRENT PROBLEM
63M with 2 weeks of worsening dizziness (room is spinning) refractory to meclizine (given by OSH 2 weeks ago). Patient notes dizziness is worse w/ movement. ROS also revealed gait ataxia (left-sided preference) and N/V, admitted for CVA workup.

## 2018-01-16 NOTE — DISCHARGE NOTE ADULT - MEDICATION SUMMARY - MEDICATIONS TO TAKE
I will START or STAY ON the medications listed below when I get home from the hospital:    aspirin 81 mg oral delayed release tablet  -- 1 tab(s) by mouth once a day  -- Indication: For Acute CVA (cerebrovascular accident)    telmisartan 40 mg oral tablet  -- 1 tab(s) by mouth once a day  -- Indication: For Essential hypertension    meclizine 25 mg oral tablet  -- 1 tab(s) by mouth once a day, As Needed  -- Indication: For Vertigo as late effect of stroke    atorvastatin 80 mg oral tablet  -- 1 tab(s) by mouth once a day (at bedtime)  -- Indication: For Acute CVA (cerebrovascular accident)    clopidogrel 75 mg oral tablet  -- 1 tab(s) by mouth once a day x 21 days  -- Indication: For Need for prophylactic measure    metoprolol succinate 25 mg oral tablet, extended release  -- 1 tab(s) by mouth once a day  -- Indication: For Essential hypertension    amLODIPine 10 mg oral tablet  -- 1 tab(s) by mouth once a day  -- Indication: For Essential hypertension I will START or STAY ON the medications listed below when I get home from the hospital:    aspirin 81 mg oral delayed release tablet  -- 1 tab(s) by mouth once a day  -- Indication: For Acute CVA (cerebrovascular accident)    telmisartan 40 mg oral tablet  -- 1 tab(s) by mouth once a day  -- Indication: For Essential hypertension    Reglan 10 mg oral tablet  -- 1 tab(s) by mouth 4 times a day (before meals and at bedtime), As Needed  -- Indication: For GI upset    meclizine 25 mg oral tablet  -- 1 tab(s) by mouth once a day, As Needed  -- Indication: For Vertigo as late effect of stroke    atorvastatin 80 mg oral tablet  -- 1 tab(s) by mouth once a day (at bedtime)  -- Indication: For Acute CVA (cerebrovascular accident)    clopidogrel 75 mg oral tablet  -- 1 tab(s) by mouth once a day x 21 days  -- Indication: For Need for prophylactic measure    metoprolol succinate 25 mg oral tablet, extended release  -- 1 tab(s) by mouth once a day  -- Indication: For Essential hypertension    amLODIPine 10 mg oral tablet  -- 1 tab(s) by mouth once a day  -- Indication: For Essential hypertension

## 2018-01-16 NOTE — DISCHARGE NOTE ADULT - PATIENT PORTAL LINK FT
“You can access the [employer] Patient Portal, offered by [facility], by registering with the following website: [email or web address]”

## 2018-01-16 NOTE — DISCHARGE NOTE ADULT - MEDICATION SUMMARY - MEDICATIONS TO CHANGE
I will SWITCH the dose or number of times a day I take the medications listed below when I get home from the hospital:    Toprol-XL 50 mg oral tablet, extended release  -- 1 tab(s) by mouth once a day    telmisartan 80 mg oral tablet  -- 1 tab(s) by mouth once a day

## 2018-01-16 NOTE — CONSULT NOTE ADULT - ASSESSMENT
63 year old male with PMH of HTN presented with dizziness found to have acute CVA of R inferior cerebellar.  Telemetry EKG recorded NSR without evidence of PAF.  CRYSTAL-AF have demonstrated evidence that prolonged cardiac monitoring yielded higher rate of AF detection in cryptogenic stroke patients.  After a long discussion with patient and his family, patient declined ILR.  He opted for 30 days event monitor.    -Will order 30 days event monitor prior to discharge as patient declined ILR  -continue telemetry monitoring

## 2018-01-16 NOTE — DISCHARGE NOTE ADULT - PROVIDER TOKENS
FREE:[LAST:[Dr Braga],PHONE:[(   )    -],FAX:[(   )    -]] FREE:[LAST:[Dr Braga],FIRST:[Adelso],PHONE:[phone]:[(   )    -]] FREE:[LAST:[Dr Braga],FIRST:[Adelso],PHONE:[phone]:[(   )    -]],TOKEN:'[id number]' TOKEN:'[id number]',FREE:[LAST:[Dr Braga],FIRST:[Adelso],PHONE:[phone]:[(   )    -]],TOKEN:'[id number]'

## 2018-01-16 NOTE — DISCHARGE NOTE ADULT - ADDITIONAL INSTRUCTIONS
Follow up with PMD within 1 week. Call for an appointment  Follow up with neurologist within 1 week.  take medications as prescribed. Follow up with PMD within 1 week. Call for an appointment  Follow up with neurologist Dr ELEONORA Torres within 1 week. Call for an appointment - [phone]  take medications as prescribed.

## 2018-01-16 NOTE — DISCHARGE NOTE ADULT - CARE PLAN
Principal Discharge DX:	Cerebrovascular accident (CVA), unspecified mechanism  Goal:	Resolution of symptoms  Assessment and plan of treatment:	Follow with PMD within 1 week. Call for an appointment  Take medications as prescribed  Secondary Diagnosis:	Essential hypertension  Goal:	Satisfactory blood pressures  Assessment and plan of treatment:	Follow with PMD  Take medications as prescribed  Secondary Diagnosis:	Diabetes  Goal:	satisfactory glucose levels  Assessment and plan of treatment:	Follow with PMD  Secondary Diagnosis:	Vertigo as late effect of stroke  Assessment and plan of treatment:	Follow with PMD  Secondary Diagnosis:	Cerebral aneurysm, nonruptured  Assessment and plan of treatment:	Follow with PMD

## 2018-01-17 DIAGNOSIS — E11.9 TYPE 2 DIABETES MELLITUS WITHOUT COMPLICATIONS: ICD-10-CM

## 2018-01-17 PROBLEM — Z00.00 ENCOUNTER FOR PREVENTIVE HEALTH EXAMINATION: Status: ACTIVE | Noted: 2018-01-17

## 2018-01-17 LAB
BUN SERPL-MCNC: 21 MG/DL — SIGNIFICANT CHANGE UP (ref 7–23)
CALCIUM SERPL-MCNC: 8.9 MG/DL — SIGNIFICANT CHANGE UP (ref 8.4–10.5)
CHLORIDE SERPL-SCNC: 97 MMOL/L — LOW (ref 98–107)
CO2 SERPL-SCNC: 30 MMOL/L — SIGNIFICANT CHANGE UP (ref 22–31)
CREAT SERPL-MCNC: 1.24 MG/DL — SIGNIFICANT CHANGE UP (ref 0.5–1.3)
GLUCOSE SERPL-MCNC: 111 MG/DL — HIGH (ref 70–99)
HCT VFR BLD CALC: 48.5 % — SIGNIFICANT CHANGE UP (ref 39–50)
HGB BLD-MCNC: 17 G/DL — SIGNIFICANT CHANGE UP (ref 13–17)
MAGNESIUM SERPL-MCNC: 2.3 MG/DL — SIGNIFICANT CHANGE UP (ref 1.6–2.6)
MCHC RBC-ENTMCNC: 31.1 PG — SIGNIFICANT CHANGE UP (ref 27–34)
MCHC RBC-ENTMCNC: 35.1 % — SIGNIFICANT CHANGE UP (ref 32–36)
MCV RBC AUTO: 88.7 FL — SIGNIFICANT CHANGE UP (ref 80–100)
NRBC # FLD: 0 — SIGNIFICANT CHANGE UP
PLATELET # BLD AUTO: 204 K/UL — SIGNIFICANT CHANGE UP (ref 150–400)
PMV BLD: 11.4 FL — SIGNIFICANT CHANGE UP (ref 7–13)
POTASSIUM SERPL-MCNC: 3.7 MMOL/L — SIGNIFICANT CHANGE UP (ref 3.5–5.3)
POTASSIUM SERPL-SCNC: 3.7 MMOL/L — SIGNIFICANT CHANGE UP (ref 3.5–5.3)
RBC # BLD: 5.47 M/UL — SIGNIFICANT CHANGE UP (ref 4.2–5.8)
RBC # FLD: 12.5 % — SIGNIFICANT CHANGE UP (ref 10.3–14.5)
SODIUM SERPL-SCNC: 140 MMOL/L — SIGNIFICANT CHANGE UP (ref 135–145)
WBC # BLD: 8.77 K/UL — SIGNIFICANT CHANGE UP (ref 3.8–10.5)
WBC # FLD AUTO: 8.77 K/UL — SIGNIFICANT CHANGE UP (ref 3.8–10.5)

## 2018-01-17 PROCEDURE — 99233 SBSQ HOSP IP/OBS HIGH 50: CPT

## 2018-01-17 PROCEDURE — 93312 ECHO TRANSESOPHAGEAL: CPT | Mod: 26

## 2018-01-17 PROCEDURE — 76376 3D RENDER W/INTRP POSTPROCES: CPT | Mod: 26

## 2018-01-17 PROCEDURE — 93306 TTE W/DOPPLER COMPLETE: CPT | Mod: 26

## 2018-01-17 RX ORDER — CLOPIDOGREL BISULFATE 75 MG/1
75 TABLET, FILM COATED ORAL DAILY
Qty: 0 | Refills: 0 | Status: DISCONTINUED | OUTPATIENT
Start: 2018-01-17 | End: 2018-01-18

## 2018-01-17 RX ORDER — LOSARTAN POTASSIUM 100 MG/1
25 TABLET, FILM COATED ORAL DAILY
Qty: 0 | Refills: 0 | Status: DISCONTINUED | OUTPATIENT
Start: 2018-01-17 | End: 2018-01-18

## 2018-01-17 RX ADMIN — LOSARTAN POTASSIUM 25 MILLIGRAM(S): 100 TABLET, FILM COATED ORAL at 11:58

## 2018-01-17 RX ADMIN — Medication 81 MILLIGRAM(S): at 11:58

## 2018-01-17 RX ADMIN — CLOPIDOGREL BISULFATE 75 MILLIGRAM(S): 75 TABLET, FILM COATED ORAL at 11:58

## 2018-01-17 RX ADMIN — ATORVASTATIN CALCIUM 80 MILLIGRAM(S): 80 TABLET, FILM COATED ORAL at 21:24

## 2018-01-17 RX ADMIN — ENOXAPARIN SODIUM 40 MILLIGRAM(S): 100 INJECTION SUBCUTANEOUS at 21:24

## 2018-01-17 RX ADMIN — Medication 10 MILLIGRAM(S): at 09:29

## 2018-01-17 RX ADMIN — AMLODIPINE BESYLATE 10 MILLIGRAM(S): 2.5 TABLET ORAL at 05:22

## 2018-01-17 NOTE — PROGRESS NOTE ADULT - PROBLEM SELECTOR PLAN 5
VTE with Lovenox 40 mg sub cut daily.  Dispo - Await TTE/EROS  DC planning 50 mins BP in 150s  Amlodipine increased to 10 mg , will start  Losartan( therapeutic exchange for Home med- Telmisartan)   Trend BP and adjust medications accordingly   Hold  Toprol for now

## 2018-01-17 NOTE — CHART NOTE - NSCHARTNOTEFT_GEN_A_CORE
patient declined ILR implantation.  He agreed for 30 days cardionet monitor.  Will provide a followup with EP Dr. Martines.

## 2018-01-17 NOTE — PROGRESS NOTE ADULT - PROBLEM SELECTOR PLAN 4
BP in 150s  Amlodipine increased to 10 mg , will start  Losartan( therapeutic exchange for Home med- Telmisartan)   Trend BP and adjust medications accordingly   Hold  Toprol for now A1 C 7 Pt not previously on medications  Will need to start PO meds on dc   Will obtain diabetes education

## 2018-01-17 NOTE — PROGRESS NOTE ADULT - SUBJECTIVE AND OBJECTIVE BOX
Patient is a 63y old  Male who presents with a chief complaint of Dizziness x 2 weeks (13 Jan 2018 19:34)      SUBJECTIVE / OVERNIGHT EVENTS: pt seen and examined by me Feels well  Awaiting EROS/ILR today     MEDICATIONS  (STANDING):  amLODIPine   Tablet 10 milliGRAM(s) Oral daily  aspirin enteric coated 81 milliGRAM(s) Oral daily  atorvastatin 80 milliGRAM(s) Oral at bedtime  clopidogrel Tablet 75 milliGRAM(s) Oral daily  enoxaparin Injectable 40 milliGRAM(s) SubCutaneous every 24 hours  influenza   Vaccine 0.5 milliLiter(s) IntraMuscular once    MEDICATIONS  (PRN):  acetaminophen   Tablet. 650 milliGRAM(s) Oral every 6 hours PRN Moderate Pain (4 - 6), headache  metoclopramide Injectable 10 milliGRAM(s) IV Push every 8 hours PRN headache    Vital Signs Last 24 Hrs  T(C): 36.9 (17 Jan 2018 05:20), Max: 37 (16 Jan 2018 22:26)  T(F): 98.5 (17 Jan 2018 05:20), Max: 98.6 (16 Jan 2018 22:26)  HR: 55 (17 Jan 2018 05:20) (55 - 60)  BP: 168/94 (17 Jan 2018 05:20) (168/94 - 169/97)  BP(mean): --  RR: 18 (17 Jan 2018 05:20) (17 - 18)  SpO2: 95% (17 Jan 2018 05:20) (94% - 95%)I&O's Summary      PHYSICAL EXAM:  GENERAL: NAD,   HEAD:  Normocephalic  EYES: EOMI,  conjunctiva and sclera clear  NECK: Supple,   CHEST/LUNG: Clear to auscultation bilaterally; No wheeze  HEART:  s1 s2 + Regular rate and rhythm;   ABDOMEN: Soft, Nontender, Nondistended; Bowel sounds present  EXTREMITIES:   No clubbing, cyanosis  NEURO: AAOx3, strength 5/5,fluent speech, follows commands  Cranial Nerves: EOMI , horizontal nystagmus no dysarthria  Motor: 5/5 throughout.Sensation: Intact to LT throughout  Coordination: Abnormal finger to nose test   Gait: unstable gait        LABS:                                                 17.0   8.77  )-----------( 204      ( 17 Jan 2018 05:59 )             48.5   01-17    140  |  97<L>  |  21  ----------------------------<  111<H>  3.7   |  30  |  1.24    Ca    8.9      17 Jan 2018 05:59  Mg     2.3     01-17        Urinalysis Basic - ( 13 Jan 2018 15:00 )    Color: COLORL / Appearance: CLEAR / SG: > 1.040 / pH: 7.0  Gluc: NEGATIVE / Ketone: NEGATIVE  / Bili: NEGATIVE / Urobili: NORMAL mg/dL   Blood: NEGATIVE / Protein: 20 mg/dL / Nitrite: NEGATIVE   Leuk Esterase: NEGATIVE / RBC: 0-2 / WBC 0-2   Sq Epi: x / Non Sq Epi: x / Bacteria: x

## 2018-01-18 VITALS
TEMPERATURE: 98 F | RESPIRATION RATE: 18 BRPM | HEART RATE: 58 BPM | SYSTOLIC BLOOD PRESSURE: 142 MMHG | DIASTOLIC BLOOD PRESSURE: 89 MMHG | OXYGEN SATURATION: 99 %

## 2018-01-18 LAB
BUN SERPL-MCNC: 23 MG/DL — SIGNIFICANT CHANGE UP (ref 7–23)
CALCIUM SERPL-MCNC: 8.4 MG/DL — SIGNIFICANT CHANGE UP (ref 8.4–10.5)
CHLORIDE SERPL-SCNC: 96 MMOL/L — LOW (ref 98–107)
CO2 SERPL-SCNC: 30 MMOL/L — SIGNIFICANT CHANGE UP (ref 22–31)
CREAT SERPL-MCNC: 1.36 MG/DL — HIGH (ref 0.5–1.3)
GLUCOSE SERPL-MCNC: 165 MG/DL — HIGH (ref 70–99)
HCT VFR BLD CALC: 45.7 % — SIGNIFICANT CHANGE UP (ref 39–50)
HGB BLD-MCNC: 16 G/DL — SIGNIFICANT CHANGE UP (ref 13–17)
MCHC RBC-ENTMCNC: 31 PG — SIGNIFICANT CHANGE UP (ref 27–34)
MCHC RBC-ENTMCNC: 35 % — SIGNIFICANT CHANGE UP (ref 32–36)
MCV RBC AUTO: 88.6 FL — SIGNIFICANT CHANGE UP (ref 80–100)
NRBC # FLD: 0 — SIGNIFICANT CHANGE UP
PLATELET # BLD AUTO: 199 K/UL — SIGNIFICANT CHANGE UP (ref 150–400)
PMV BLD: 11.7 FL — SIGNIFICANT CHANGE UP (ref 7–13)
POTASSIUM SERPL-MCNC: 3.6 MMOL/L — SIGNIFICANT CHANGE UP (ref 3.5–5.3)
POTASSIUM SERPL-SCNC: 3.6 MMOL/L — SIGNIFICANT CHANGE UP (ref 3.5–5.3)
RBC # BLD: 5.16 M/UL — SIGNIFICANT CHANGE UP (ref 4.2–5.8)
RBC # FLD: 12.5 % — SIGNIFICANT CHANGE UP (ref 10.3–14.5)
SODIUM SERPL-SCNC: 139 MMOL/L — SIGNIFICANT CHANGE UP (ref 135–145)
WBC # BLD: 9.55 K/UL — SIGNIFICANT CHANGE UP (ref 3.8–10.5)
WBC # FLD AUTO: 9.55 K/UL — SIGNIFICANT CHANGE UP (ref 3.8–10.5)

## 2018-01-18 PROCEDURE — 99239 HOSP IP/OBS DSCHRG MGMT >30: CPT

## 2018-01-18 RX ORDER — TELMISARTAN 20 MG/1
1 TABLET ORAL
Qty: 30 | Refills: 0 | OUTPATIENT
Start: 2018-01-18 | End: 2018-02-16

## 2018-01-18 RX ORDER — MECLIZINE HCL 12.5 MG
1 TABLET ORAL
Qty: 30 | Refills: 0 | OUTPATIENT
Start: 2018-01-18 | End: 2018-02-16

## 2018-01-18 RX ORDER — ATORVASTATIN CALCIUM 80 MG/1
1 TABLET, FILM COATED ORAL
Qty: 30 | Refills: 0 | OUTPATIENT
Start: 2018-01-18 | End: 2018-02-16

## 2018-01-18 RX ORDER — METOPROLOL TARTRATE 50 MG
1 TABLET ORAL
Qty: 30 | Refills: 0 | OUTPATIENT
Start: 2018-01-18 | End: 2018-02-16

## 2018-01-18 RX ORDER — METOCLOPRAMIDE HCL 10 MG
1 TABLET ORAL
Qty: 120 | Refills: 0 | OUTPATIENT
Start: 2018-01-18 | End: 2018-02-16

## 2018-01-18 RX ORDER — CLOPIDOGREL BISULFATE 75 MG/1
1 TABLET, FILM COATED ORAL
Qty: 21 | Refills: 0 | OUTPATIENT
Start: 2018-01-18 | End: 2018-02-07

## 2018-01-18 RX ORDER — AMLODIPINE BESYLATE 2.5 MG/1
1 TABLET ORAL
Qty: 30 | Refills: 0 | OUTPATIENT
Start: 2018-01-18 | End: 2018-02-16

## 2018-01-18 RX ORDER — ASPIRIN/CALCIUM CARB/MAGNESIUM 324 MG
1 TABLET ORAL
Qty: 30 | Refills: 0 | OUTPATIENT
Start: 2018-01-18 | End: 2018-02-16

## 2018-01-18 RX ORDER — METOCLOPRAMIDE HCL 10 MG
10 TABLET ORAL ONCE
Qty: 0 | Refills: 0 | Status: COMPLETED | OUTPATIENT
Start: 2018-01-18 | End: 2018-01-18

## 2018-01-18 RX ORDER — MECLIZINE HCL 12.5 MG
1 TABLET ORAL
Qty: 0 | Refills: 0 | COMMUNITY

## 2018-01-18 RX ORDER — METOPROLOL TARTRATE 50 MG
1 TABLET ORAL
Qty: 0 | Refills: 0 | COMMUNITY

## 2018-01-18 RX ORDER — AMLODIPINE BESYLATE 2.5 MG/1
1 TABLET ORAL
Qty: 0 | Refills: 0 | COMMUNITY

## 2018-01-18 RX ORDER — TELMISARTAN 20 MG/1
1 TABLET ORAL
Qty: 0 | Refills: 0 | COMMUNITY

## 2018-01-18 RX ADMIN — LOSARTAN POTASSIUM 25 MILLIGRAM(S): 100 TABLET, FILM COATED ORAL at 05:09

## 2018-01-18 RX ADMIN — Medication 81 MILLIGRAM(S): at 17:56

## 2018-01-18 RX ADMIN — CLOPIDOGREL BISULFATE 75 MILLIGRAM(S): 75 TABLET, FILM COATED ORAL at 17:56

## 2018-01-18 RX ADMIN — AMLODIPINE BESYLATE 10 MILLIGRAM(S): 2.5 TABLET ORAL at 05:08

## 2018-01-18 RX ADMIN — Medication 10 MILLIGRAM(S): at 14:13

## 2018-01-18 NOTE — PROGRESS NOTE ADULT - PROBLEM SELECTOR PROBLEM 1
Acute CVA (cerebrovascular accident)
R/O Vertigo as late effect of stroke
Acute CVA (cerebrovascular accident)

## 2018-01-18 NOTE — PROGRESS NOTE ADULT - PROBLEM SELECTOR PLAN 1
CT/CTA reviewed  f/u MRI  c/w  ASA 81mg, statin  PT/OT/ SS .  TTE
CT/CTA reviewed  f/u MRI  c/w  ASA 81mg, statin  Permissive HTN, -180 till am  PT/OT/ SS .
MRI with Acute right inferior cerebellar infarct corresponding with a lucency seen   on brain CT. Chronic left thalamic lacunar infarct. Mild microvascular ischemic change.  CT Angio showing nonvisualization and likely occlusion of the right posterior   inferior cerebellar artery  c/w  ASA 81mg, increase Lipitor to 80 mg   Await TTE  SS- regular diet   PT-Anticipate home with home PT
MRI with Acute right inferior cerebellar infarct corresponding with a lucency seen   on brain CT. Chronic left thalamic lacunar infarct. Mild microvascular ischemic change.  CT Angio showing nonvisualization and likely occlusion of the right posterior   inferior cerebellar artery  c/w Aspirin and clopidogrel for 3 weeks followed by aspirin for secondary stroke prevention, increase Lipitor to 80 mg   neuro recommends TTE/EROS/ILR   Pt declined ILR, opted for 30 days event monitor  EROS- No LV thrombus  SS- regular diet   PT-Anticipate home with home PT
MRI with Acute right inferior cerebellar infarct corresponding with a lucency seen   on brain CT. Chronic left thalamic lacunar infarct. Mild microvascular ischemic change.  CT Angio showing nonvisualization and likely occlusion of the right posterior   inferior cerebellar artery  c/w Aspirin and clopidogrel for 3 weeks followed by aspirin for secondary stroke prevention, increase Lipitor to 80 mg   neuro recommends TTE/EROS/ILR   Pt declined ILR, opted for 30 days event monitor  SS- regular diet   PT-Anticipate home with home PT

## 2018-01-18 NOTE — PROGRESS NOTE ADULT - PROBLEM SELECTOR PLAN 5
BP in 150s  Amlodipine increased to 10 mg , will start  Losartan( therapeutic exchange for Home med- Telmisartan)   Trend BP and adjust medications accordingly   Hold  Toprol for now

## 2018-01-18 NOTE — PROGRESS NOTE ADULT - PROBLEM SELECTOR PLAN 4
A1 C 7 Pt not previously on medications  Will need to start PO meds on dc   Will obtain diabetes education A1 C 7 Pt not previously on medications  Diabetes education ordered  I discussed with pt options for PO meds - He reports he knew he had borderline DM and wants to try diet and lifestyle modifications before taking meds. I explained to him the benefit of tight glycemic control in the setting of a CVA but he still opts for diet and lifestyle change. He will FU with his PMD in 4-6 weeks to discuss further

## 2018-01-18 NOTE — PROGRESS NOTE ADULT - SUBJECTIVE AND OBJECTIVE BOX
Patient is a 63y old  Male who presents with a chief complaint of Dizziness x 2 weeks (13 Jan 2018 19:34)      SUBJECTIVE / OVERNIGHT EVENTS: pt seen and examined by me Feels well  s/p EROS yesterday      MEDICATIONS  (STANDING):  amLODIPine   Tablet 10 milliGRAM(s) Oral daily  aspirin enteric coated 81 milliGRAM(s) Oral daily  atorvastatin 80 milliGRAM(s) Oral at bedtime  clopidogrel Tablet 75 milliGRAM(s) Oral daily  enoxaparin Injectable 40 milliGRAM(s) SubCutaneous every 24 hours  influenza   Vaccine 0.5 milliLiter(s) IntraMuscular once  losartan 25 milliGRAM(s) Oral daily    MEDICATIONS  (PRN):  acetaminophen   Tablet. 650 milliGRAM(s) Oral every 6 hours PRN Moderate Pain (4 - 6), headache    Vital Signs Last 24 Hrs  T(C): 36.7 (18 Jan 2018 05:07), Max: 37.5 (17 Jan 2018 21:19)  T(F): 98.1 (18 Jan 2018 05:07), Max: 99.5 (17 Jan 2018 21:19)  HR: 60 (18 Jan 2018 05:07) (56 - 84)  BP: 146/89 (18 Jan 2018 05:07) (136/92 - 163/103)  BP(mean): --  RR: 18 (18 Jan 2018 05:07) (18 - 18)  SpO2: 95% (18 Jan 2018 05:07) (95% - 95%)    PHYSICAL EXAM:  GENERAL: NAD,   HEAD:  Normocephalic  EYES: EOMI,  conjunctiva and sclera clear  NECK: Supple,   CHEST/LUNG: Clear to auscultation bilaterally; No wheeze  HEART:  s1 s2 + Regular rate and rhythm;   ABDOMEN: Soft, Nontender, Nondistended; Bowel sounds present  EXTREMITIES:   No clubbing, cyanosis  NEURO: AAOx3, strength 5/5,fluent speech, follows commands  Cranial Nerves: EOMI , horizontal nystagmus no dysarthria  Motor: 5/5 throughout.Sensation: Intact to LT throughout  Coordination: Abnormal finger to nose test   Gait: unstable gait        LABS:                                                     16.0   9.55  )-----------( 199      ( 18 Jan 2018 06:05 )             45.7     01-18    139  |  96<L>  |  23  ----------------------------<  165<H>  3.6   |  30  |  1.36<H>    Ca    8.4      18 Jan 2018 06:05  Mg     2.3     01-17     - neuro

## 2018-01-18 NOTE — PROGRESS NOTE ADULT - PROBLEM SELECTOR PROBLEM 3
Vertigo as late effect of stroke

## 2018-01-18 NOTE — PROGRESS NOTE ADULT - PROBLEM SELECTOR PLAN 6
VTE with Lovenox 40 mg SQ  Dispo - pt medically optimized for DC  DC planning 50 mins
VTE with Lovenox 40 mg sub cut daily.  Dispo - Await TTE/EROS  DC planning 50 mins

## 2018-01-18 NOTE — PROGRESS NOTE ADULT - NSHPATTENDINGPLANDISCUSS_GEN_ALL_CORE
Pt and son at bedside
neurology resident
Pt and son at bedside
Pt, wife and son at bedside
Pt and son at bedside
Pt and son at bedside

## 2018-01-18 NOTE — PROGRESS NOTE ADULT - PROBLEM SELECTOR PLAN 3
Rehab
Rehab
continue supportive care. Meclizine PRN if helps

## 2018-01-18 NOTE — PROGRESS NOTE ADULT - PROBLEM SELECTOR PLAN 2
Cerebral artery aneurysm  Neurosurgery recs reviewed, outpt f/u
Cerebral artery aneurysm  Neurosurgery recs reviewed, outpt f/u
Cerebral artery aneurysm  Neurosurgery recs reviewed- Advised  Outpatient follow up with Dr Betts [phone].
Cerebral artery aneurysm  Neurosurgery recs reviewed- Advised  Outpatient follow up with Dr Betts [phone].  Repeat MRA head in about 6 months to follow up on aneurysm and follow up with NSGY for the same
Cerebral artery aneurysm  Neurosurgery recs reviewed- Advised  Outpatient follow up with Dr Betts [phone].  Repeat MRA head in about 6 months to follow up on aneurysm and follow up with NSGY for the same

## 2018-01-19 ENCOUNTER — MEDICATION RENEWAL (OUTPATIENT)
Age: 63
End: 2018-01-19

## 2018-01-19 DIAGNOSIS — I63.9 CEREBRAL INFARCTION, UNSPECIFIED: ICD-10-CM

## 2018-01-19 RX ORDER — ATORVASTATIN CALCIUM 80 MG/1
80 TABLET, FILM COATED ORAL DAILY
Qty: 90 | Refills: 1 | Status: ACTIVE | COMMUNITY
Start: 2018-01-19 | End: 1900-01-01

## 2018-01-19 RX ORDER — CLOPIDOGREL BISULFATE 75 MG/1
75 TABLET, FILM COATED ORAL
Qty: 90 | Refills: 1 | Status: ACTIVE | COMMUNITY
Start: 2018-01-19 | End: 1900-01-01

## 2018-02-27 ENCOUNTER — APPOINTMENT (OUTPATIENT)
Dept: ELECTROPHYSIOLOGY | Facility: CLINIC | Age: 63
End: 2018-02-27

## 2018-03-06 ENCOUNTER — APPOINTMENT (OUTPATIENT)
Dept: NEUROLOGY | Facility: CLINIC | Age: 63
End: 2018-03-06

## 2018-03-08 ENCOUNTER — APPOINTMENT (OUTPATIENT)
Dept: NEUROLOGY | Facility: CLINIC | Age: 63
End: 2018-03-08

## 2018-04-02 NOTE — CONSULT NOTE ADULT - ATTENDING COMMENTS
1. Have you been to the ER, urgent care clinic since your last visit? Hospitalized since your last visit? No    2. Have you seen or consulted any other health care providers outside of the [facility] since your last visit? Include any pap smears or colon screening.  No
Discussed and agreed with above. Outpt follow-up with me after full neurology evaluation.
Patients has ataxia and vertigo, improving.  The right PCA occlusion does not explain the cerebellar stroke seen on CT.  Await MRI brain.  Stroke w/u in progress.  Small incidental right MCA aneurysm does not likely need intervention, NS called to evaluate.

## 2018-04-24 ENCOUNTER — APPOINTMENT (OUTPATIENT)
Dept: NEUROLOGY | Facility: CLINIC | Age: 63
End: 2018-04-24

## 2018-08-21 NOTE — ED PROVIDER NOTE - DECREASED ABILITY TO STAND/WALK
pt reports working on his car piece of metal got into left eye ,c/o eye being uncomfortable OFF BALANCE

## 2022-03-17 NOTE — PATIENT PROFILE ADULT. - HEALTHCARE QUESTIONS, PROFILE

## 2024-05-28 NOTE — ED PROVIDER NOTE - CPE EDP CARDIAC NORM
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

## 2024-05-28 NOTE — ED ADULT NURSE NOTE - NSHISCREENINGQ1_ED_A_ED
No. MARYSE screening performed.  STOP BANG Legend: 0-2 = LOW Risk; 3-4 = INTERMEDIATE Risk; 5-8 = HIGH Risk
No
